# Patient Record
Sex: FEMALE | Race: WHITE | NOT HISPANIC OR LATINO | Employment: OTHER
[De-identification: names, ages, dates, MRNs, and addresses within clinical notes are randomized per-mention and may not be internally consistent; named-entity substitution may affect disease eponyms.]

---

## 2017-09-24 ENCOUNTER — HEALTH MAINTENANCE LETTER (OUTPATIENT)
Age: 62
End: 2017-09-24

## 2020-11-03 ENCOUNTER — TRANSFERRED RECORDS (OUTPATIENT)
Dept: MULTI SPECIALTY CLINIC | Facility: CLINIC | Age: 65
End: 2020-11-03

## 2024-07-30 ENCOUNTER — OFFICE VISIT (OUTPATIENT)
Dept: FAMILY MEDICINE | Facility: CLINIC | Age: 69
End: 2024-07-30
Payer: COMMERCIAL

## 2024-07-30 VITALS
RESPIRATION RATE: 16 BRPM | HEART RATE: 89 BPM | DIASTOLIC BLOOD PRESSURE: 80 MMHG | BODY MASS INDEX: 23.05 KG/M2 | TEMPERATURE: 98.3 F | SYSTOLIC BLOOD PRESSURE: 122 MMHG | HEIGHT: 64 IN | WEIGHT: 135 LBS | OXYGEN SATURATION: 97 %

## 2024-07-30 DIAGNOSIS — F10.20 ALCOHOL USE DISORDER, MODERATE, DEPENDENCE (H): ICD-10-CM

## 2024-07-30 DIAGNOSIS — M19.042 PRIMARY OSTEOARTHRITIS OF BOTH HANDS: Primary | ICD-10-CM

## 2024-07-30 DIAGNOSIS — F33.1 MAJOR DEPRESSIVE DISORDER, RECURRENT EPISODE, MODERATE (H): ICD-10-CM

## 2024-07-30 DIAGNOSIS — M19.041 PRIMARY OSTEOARTHRITIS OF BOTH HANDS: Primary | ICD-10-CM

## 2024-07-30 PROCEDURE — 99203 OFFICE O/P NEW LOW 30 MIN: CPT | Performed by: NURSE PRACTITIONER

## 2024-07-30 RX ORDER — AMLODIPINE BESYLATE 2.5 MG/1
2.5 TABLET ORAL DAILY
COMMUNITY
Start: 2023-09-18

## 2024-07-30 RX ORDER — ATORVASTATIN CALCIUM 20 MG/1
20 TABLET, FILM COATED ORAL DAILY
COMMUNITY
Start: 2023-09-18

## 2024-07-30 RX ORDER — BUPROPION HYDROCHLORIDE 150 MG/1
1 TABLET ORAL DAILY
COMMUNITY
Start: 2023-09-18 | End: 2024-09-17

## 2024-07-30 RX ORDER — QUETIAPINE FUMARATE 25 MG/1
25 TABLET, FILM COATED ORAL 2 TIMES DAILY
COMMUNITY

## 2024-07-30 RX ORDER — TIZANIDINE 2 MG/1
2 TABLET ORAL
COMMUNITY
Start: 2023-10-03 | End: 2024-09-27

## 2024-07-30 ASSESSMENT — PAIN SCALES - GENERAL: PAINLEVEL: SEVERE PAIN (7)

## 2024-07-30 NOTE — PROGRESS NOTES
"  Assessment & Plan     Primary osteoarthritis of both hands  Continues to have hand pain x-ray did show some degenerative changes we will start with hand therapy and if not improving follow-up with orthopedics  - REVIEW OF HEALTH MAINTENANCE PROTOCOL ORDERS  - Occupational Therapy  Referral; Future  - Orthopedic  Referral; Future    Major depressive disorder, recurrent episode, moderate (H)  Stable    Alcohol use disorder, moderate, dependence (H)  In remission            See Patient Instructions    Manav Cabrera is a 68 year old, presenting for the following health issues:  Hand Pain      7/30/2024    12:58 PM   Additional Questions   Roomed by Krysta     Hand Pain    History of Present Illness       Reason for visit:  Jinfer joint pain  Symptom onset:  More than a month    She eats 2-3 servings of fruits and vegetables daily.She consumes 2 sweetened beverage(s) daily.She exercises with enough effort to increase her heart rate 10 to 19 minutes per day.  She exercises with enough effort to increase her heart rate 3 or less days per week. She is missing 1 dose(s) of medications per week.             Review of Systems  Constitutional, HEENT, cardiovascular, pulmonary, gi and gu systems are negative, except as otherwise noted.      Objective    /80 (BP Location: Right arm)   Pulse 89   Temp 98.3  F (36.8  C) (Tympanic)   Resp 16   Ht 1.613 m (5' 3.5\")   Wt 61.2 kg (135 lb)   LMP 07/30/2009   SpO2 97%   BMI 23.54 kg/m    Body mass index is 23.54 kg/m .  Physical Exam   GENERAL: alert and no distress  EYES: Eyes grossly normal to inspection, PERRL and conjunctivae and sclerae normal  RESP: lungs clear to auscultation - no rales, rhonchi or wheezes  CV: regular rate and rhythm, normal S1 S2, no S3 or S4, no murmur, click or rub, no peripheral edema  MS: no gross musculoskeletal defects noted, no edema swelling to the PIP joint with limited range of motion to the left and right " hand  SKIN: no suspicious lesions or rashes  NEURO: Normal strength and tone, mentation intact and speech normal  PSYCH: mentation appears normal, affect normal/bright    No results found for any visits on 07/30/24.        Signed Electronically by: TRINY Quinones CNP

## 2024-08-04 PROBLEM — J96.01 ACUTE RESPIRATORY FAILURE WITH HYPOXIA (H): Status: RESOLVED | Noted: 2024-08-04 | Resolved: 2024-08-04

## 2024-08-07 ENCOUNTER — THERAPY VISIT (OUTPATIENT)
Dept: OCCUPATIONAL THERAPY | Facility: CLINIC | Age: 69
End: 2024-08-07
Attending: NURSE PRACTITIONER
Payer: COMMERCIAL

## 2024-08-07 DIAGNOSIS — M19.041 PRIMARY OSTEOARTHRITIS OF BOTH HANDS: ICD-10-CM

## 2024-08-07 DIAGNOSIS — M19.042 PRIMARY OSTEOARTHRITIS OF BOTH HANDS: ICD-10-CM

## 2024-08-07 PROCEDURE — 97535 SELF CARE MNGMENT TRAINING: CPT | Mod: GO | Performed by: OCCUPATIONAL THERAPIST

## 2024-08-07 PROCEDURE — 97022 WHIRLPOOL THERAPY: CPT | Mod: GO | Performed by: OCCUPATIONAL THERAPIST

## 2024-08-07 PROCEDURE — 97165 OT EVAL LOW COMPLEX 30 MIN: CPT | Mod: GO | Performed by: OCCUPATIONAL THERAPIST

## 2024-08-07 PROCEDURE — 97110 THERAPEUTIC EXERCISES: CPT | Mod: GO | Performed by: OCCUPATIONAL THERAPIST

## 2024-08-07 NOTE — PROGRESS NOTES
OCCUPATIONAL THERAPY EVALUATION  Type of Visit: Evaluation       Fall Risk Screen:  Fall screen completed by: OT  Have you fallen 2 or more times in the past year?: No  Have you fallen and had an injury in the past year?: No  Is patient a fall risk?: No    Subjective      Presenting condition or subjective complaint: swollen finger joints. Was packing to come home from [place] and since then has had swelling in left Pip since and then started on right same finger and joint about a month ago. . Is painful and swollen . Getting harder to make a fist or open a jar  Date of onset: 04/30/24    Relevant medical history:     Dates & types of surgery: tonsilectomy age 7 tubaligation 1982,tendonitis elbow about 1992    Prior diagnostic imaging/testing results: X-ray   IMPRESSION: No fracture. Near-anatomic alignment. Moderate degenerative joint disease involving the proximal and distal interphalangeal joints, with small dorsal osteophytes at both joint spaces. Soft tissue edema.  Exam End: 06/04/24  3:34 PM     Prior therapy history for the same diagnosis, illness or injury: No      Prior Level of Function  Transfers: Independent  Ambulation: Independent  ADL: Independent  IADL:  Independent    Living Environment  Social support: With a significant other or spouse   Type of home: House   Stairs to enter the home:         Ramp: No   Stairs inside the home: Yes 15 Is there a railing: Yes     Help at home: None  Equipment owned: [employer]     Employment: No    Hobbies/Interests: thrifting, gardening    Patient goals for therapy: make a closed fist and be able to open jars    Pain assessment: See objective evaluation for additional pain details     Objective   ADDITIONAL HISTORY:  Right hand dominant and Ambidextrous  Patient reports symptoms of pain, stiffness/loss of motion, weakness/loss of strength, and edema  Transportation: drives  Currently retired    Functional Outcome Measure:       8/7/2024     9:02 AM   Upper Extremity  Functional Index (  1996 [place])   a.  Any of your usual work, housework or school activities 2-Moderate Difficulty   b.  Your usual hobbies, recreational or sporting activities 2-Moderate Difficulty   c.  Lifting a bag of groceries to waist level 2-Moderate Difficulty   d.  Placing an object onto, or removing it from an overhead shelf 2-Moderate Difficulty   e.  Washing your hair or scalp 2-Moderate Difficulty   f.   Pushing up on your hands (e.g., from bathtub or chair) 2-Moderate Difficulty   g.  Preparing food (e.g., peeling, cutting) 2-Moderate Difficulty   h.  Driving 3-A Little bit of Difficulty   I.   Vacuuming, sweeping, or raking 2-Moderate Difficulty   j.   Dressing 3-A Little bit of Difficulty   k.  Doing up buttons 2-Moderate Difficulty   l.   Using tools or appliances 2-Moderate Difficulty   m. Opening doors 2-Moderate Difficulty   n.  Cleaning 2-Moderate Difficulty   o.  Tying or lacing shoes 3-A Little bit of Difficulty   p.  Sleeping 2-Moderate Difficulty   q.  Laundering clothes. (e.g., washing, ironing, folding) 2-Moderate Difficulty   r.   Opening a jar 1-Quite a bit of Difficulty   s.  Throwing a ball 2-Moderate Difficulty   t.   Carrying a small suitcase with your affected limb  2-Moderate Difficulty   Column Totals: /80 42        PAIN:  Pain Level at Rest: 4/10  Pain Level with Use: 9/10  Pain Location: small finger  Pain Quality: Aching and Throbbing  Pain Frequency: constant  Pain is Worst: daytime  Pain is Exacerbated By: lifting, opening things  Pain is Relieved By: has not tried much and none  Pain Progression: Worsened    POSTURE: Normal     EDEMA: Mild    Finger/Thumb   (Circumference measured in cm) 8/7/2024   Small P1    PIP Left-5.6,Right-5.4              SENSATION: WNL throughout all nerve distributions; per patient report         ROM:   Hand ROM  Left AROM Right AROM    Small MP     PIP 20/55 0/75     Flexion Lag (Tips of fingernails to DPC or cast as measured (cm) by Digit o  Meter) Left Right   DPC to Index 2 2   DPC to Long 2.5 1.5   DPC to Ring 3 1.5   DPC to Small 3 1   DPC to Thumb         OBSERVATIONS/APPEARANCE:  PIP joints appear larger on all especially left PIP          STRENGTH:     Measured in pounds 8/7/2024 8/7/2024    Left Right   Average 22 7/10 pain 28     Lateral Pinch  Measured in pounds 8/7/2024 8/7/2024    Left Right   Average 12 12     3 Point Pinch  Measured in pounds 8/7/2024 8/7/2024    Left Right   Average 7 10       PALPATION:  Increased pain with palpation to PIP joints small fingers especially left           Assessment & Plan   CLINICAL IMPRESSIONS  Medical Diagnosis: Bilateral Hand Osteoarthritis    Treatment Diagnosis: decreased ADL's IADL's due to pain    Impression/Assessment: Pt is a 68 year old female presenting to Occupational Therapy due to above dx.  The following significant findings have been identified: Impaired ROM, Impaired strength, Pain, and edema .  These identified deficits interfere with their ability to perform self care tasks, recreational activities, household chores, driving , and  yard work as compared to previous level of function.     Clinical Decision Making (Complexity):  Assessment of Occupational Performance: 5 or more Performance Deficits  Occupational Performance Limitations: dressing, hygiene and grooming, driving and community mobility, meal preparation and cleanup, sleep, and leisure activities  Clinical Decision Making (Complexity): Low complexity    PLAN OF CARE  Treatment Interventions:  Modalities:  Fluidotherapy and Paraffin  Therapeutic Exercise:  AROM, PROM, Tendon Gliding, Blocking, and Isometrics  Manual Techniques:  Myofascial release and STM  Orthotic Fabrication:  Finger based  Self Care:  Self Care Tasks and Ergonomic Considerations    Long Term Goals   OT Goal 1  Goal Identifier: closed fist  Goal Description: patientt o be able to make a closed fist  Rationale: In order to maximize safety and independence  with ADL/IADLs  Target Date: 09/11/24  OT Goal 2  Goal Identifier: open jars  Goal Description: Increase bilateral  strength by  8# to be able to better  get jars open  Rationale: In order to maximize safety and independence with ADL/IADLs  Target Date: 09/18/24  OT Goal 3  Goal Identifier: home program  Goal Description: Patient to be independent with home program, not needing more than 15% verbal or physical cuing  Target Date: 09/18/24      Frequency of Treatment: 1x/week  Duration of Treatment: 6 weeks     Recommended Referrals to Other Professionals:   Education Assessment: Learner/Method: Patient;Listening;Demonstration;Reading;Pictures/Video;No Barriers to Learning  Education Comments: PTRX printed     Risks and benefits of evaluation/treatment have been explained.   Patient/Family/caregiver agrees with Plan of Care.     Evaluation Time:    OT Eval, Low Complexity Minutes (63505): 20       Signing Clinician:  VERO Noguera/L CHT  Occupational Therapist, Certified Hand Therapist

## 2024-08-14 ENCOUNTER — THERAPY VISIT (OUTPATIENT)
Dept: OCCUPATIONAL THERAPY | Facility: CLINIC | Age: 69
End: 2024-08-14
Attending: NURSE PRACTITIONER
Payer: COMMERCIAL

## 2024-08-14 DIAGNOSIS — M19.042 PRIMARY OSTEOARTHRITIS OF BOTH HANDS: Primary | ICD-10-CM

## 2024-08-14 DIAGNOSIS — M19.041 PRIMARY OSTEOARTHRITIS OF BOTH HANDS: Primary | ICD-10-CM

## 2024-08-14 PROCEDURE — 97110 THERAPEUTIC EXERCISES: CPT | Mod: GO | Performed by: OCCUPATIONAL THERAPIST

## 2024-08-14 PROCEDURE — 97140 MANUAL THERAPY 1/> REGIONS: CPT | Mod: GO | Performed by: OCCUPATIONAL THERAPIST

## 2024-08-14 PROCEDURE — 97022 WHIRLPOOL THERAPY: CPT | Mod: GO | Performed by: OCCUPATIONAL THERAPIST

## 2024-08-28 ENCOUNTER — OFFICE VISIT (OUTPATIENT)
Dept: ORTHOPEDICS | Facility: CLINIC | Age: 69
End: 2024-08-28
Attending: NURSE PRACTITIONER
Payer: COMMERCIAL

## 2024-08-28 DIAGNOSIS — M19.042 PRIMARY OSTEOARTHRITIS OF BOTH HANDS: ICD-10-CM

## 2024-08-28 DIAGNOSIS — M19.041 PRIMARY OSTEOARTHRITIS OF BOTH HANDS: ICD-10-CM

## 2024-08-28 PROCEDURE — 99203 OFFICE O/P NEW LOW 30 MIN: CPT | Performed by: PEDIATRICS

## 2024-08-28 NOTE — LETTER
8/28/2024      Deborah Collins  [address]  [place] [address]      Dear Colleague,    Thank you for referring your patient, Deborah Collins, to the [facility] [place]. Please see a copy of my visit note below.    ASSESSMENT & PLAN    Deborah was seen today for pain and pain.    Diagnoses and all orders for this visit:    Primary osteoarthritis of both hands  -     Orthopedic  Referral      This issue is chronic and Unchanged.        ICD-10-CM    1. Primary osteoarthritis of both hands  M19.041 Orthopedic  Referral    M19.042         Patient Instructions   We discussed supportive care of bracing and OTC medications, role of occupational hand therapy, consideration of injections and surgical referral.    Plan:  - Today's Plan of Care:  Continue Occupational Hand Therapy  Discussed bracing options - compression sleeves    Discussed activity considerations and other supportive care including Ice/Heat, OTC and other topical medications as needed. Diclofenac/Voltaren Gel.    -We also discussed other future treatment options:  US guided joint injections  Referral to Hand Surgery    Follow Up: as needed    Concerning signs and symptoms were reviewed and all questions were answered at this time.    Thanks for the opportunity to participate in the care of this patient, I will keep you updated on their progress.    CC: Fany Bonilla MD [facility]  Sports Medicine Physician  [facility]    -----  Chief Complaint   Patient presents with     Right Little Finger - Pain     Left Little Finger - Pain       SUBJECTIVE  Deborah Collins is a/an 68 year old female who is seen in consultation at the request of  Fany Higgins M.D. for evaluation of HORACE hands, little fingers on both sides.    The patient is seen by themselves.  The patient is Ambidextrous handed    Onset: 3 month(s) ago. Reports insidious onset without acute precipitating  event.  Location of Pain: bilateral hands, little fingers, PIP joint   Worsened by: grasping, grabbing, flexion   Better with: bracing from hand therapy  Treatments tried: Tylenol, home exercises, physical therapy (2 visits), previous imaging (xray 6/5/24 @ [facility]), and casting/splinting/bracing  Associated symptoms: swelling, weakness of HORACE little fingers, and throbbing pain    Orthopedic/Surgical history: YES - Date: has had many fractures in her fingers   Social History/Occupation: retired, gardening, thrifing      REVIEW OF SYSTEMS:  Review of Systems    OBJECTIVE:  LMP 07/30/2009    General: healthy, alert and in no distress  Skin: no suspicious lesions or rash.  CV: distal perfusion intact   Resp: normal respiratory effort without conversational dyspnea   Psych: normal mood and affect  Gait: NORMAL  Neuro: Normal light sensory exam of upper extremity    Bilateral Wrist and Hand exam    Inspection:       No swelling or bruising - arthritis deformities noted bilaterally    Tender:       5th finger PIP joints bilaterally    Non Tender:       Remainder of the Wrist and Hand bilateral    ROM:       Decreased full flexion bilateral 5th fingers at PIP joint, otherwise, full and symmetric active and passive range of motion of the forearm, wrist and digits bilateral    Strength:       5/5 strength in the muscles of the hand, wrist and forearm bilateral    Neurovascular:       2+ radial pulses bilaterally with brisk capillary refill and      normal sensation to light touch in the radial, median and ulnar nerve distributions       RADIOLOGY:  Final results and radiologist's interpretation, available in the [facility] health record.  Images were reviewed with the patient in the office today.  My personal interpretation of the performed imaging:     3 XR views of bilateral 5th fingers reviewed from 7/30/2021: no acute bony abnormality, degenerative change at PIP joints left > right  - will follow official  read    Review of the result(s) of each unique test - XR             Again, thank you for allowing me to participate in the care of your patient.        Sincerely,        Raeann Bonilla MD

## 2024-08-28 NOTE — PATIENT INSTRUCTIONS
We discussed supportive care of bracing and OTC medications, role of occupational hand therapy, consideration of injections and surgical referral.    Plan:  - Today's Plan of Care:  Continue Occupational Hand Therapy  Discussed bracing options - compression sleeves    Discussed activity considerations and other supportive care including Ice/Heat, OTC and other topical medications as needed. Diclofenac/Voltaren Gel.    -We also discussed other future treatment options:  US guided joint injections  Referral to Hand Surgery    Follow Up: as needed    If you have any further questions for your physician or physician s care team you can call [phone].

## 2024-08-28 NOTE — PROGRESS NOTES
ASSESSMENT & PLAN    Deborah was seen today for pain and pain.    Diagnoses and all orders for this visit:    Primary osteoarthritis of both hands  -     Orthopedic  Referral      This issue is chronic and Unchanged.        ICD-10-CM    1. Primary osteoarthritis of both hands  M19.041 Orthopedic  Referral    M19.042         Patient Instructions   We discussed supportive care of bracing and OTC medications, role of occupational hand therapy, consideration of injections and surgical referral.    Plan:  - Today's Plan of Care:  Continue Occupational Hand Therapy  Discussed bracing options - compression sleeves    Discussed activity considerations and other supportive care including Ice/Heat, OTC and other topical medications as needed. Diclofenac/Voltaren Gel.    -We also discussed other future treatment options:  US guided joint injections  Referral to Hand Surgery    Follow Up: as needed    Concerning signs and symptoms were reviewed and all questions were answered at this time.    Thanks for the opportunity to participate in the care of this patient, I will keep you updated on their progress.    CC: Fany Bonilla MD [facility]  Sports Medicine Physician  [facility]    -----  Chief Complaint   Patient presents with    Right Little Finger - Pain    Left Little Finger - Pain       SUBJECTIVE  Deborah Collins is a/an 68 year old female who is seen in consultation at the request of  Fany Higgins M.D. for evaluation of HORACE hands, little fingers on both sides.    The patient is seen by themselves.  The patient is Ambidextrous handed    Onset: 3 month(s) ago. Reports insidious onset without acute precipitating event.  Location of Pain: bilateral hands, little fingers, PIP joint   Worsened by: grasping, grabbing, flexion   Better with: bracing from hand therapy  Treatments tried: Tylenol, home exercises, physical therapy (2 visits), previous imaging (xray 6/5/24 @  [facility]), and casting/splinting/bracing  Associated symptoms: swelling, weakness of HORACE little fingers, and throbbing pain    Orthopedic/Surgical history: YES - Date: has had many fractures in her fingers   Social History/Occupation: retired, gardening, thrifing      REVIEW OF SYSTEMS:  Review of Systems    OBJECTIVE:  LMP 07/30/2009    General: healthy, alert and in no distress  Skin: no suspicious lesions or rash.  CV: distal perfusion intact   Resp: normal respiratory effort without conversational dyspnea   Psych: normal mood and affect  Gait: NORMAL  Neuro: Normal light sensory exam of upper extremity    Bilateral Wrist and Hand exam    Inspection:       No swelling or bruising - arthritis deformities noted bilaterally    Tender:       5th finger PIP joints bilaterally    Non Tender:       Remainder of the Wrist and Hand bilateral    ROM:       Decreased full flexion bilateral 5th fingers at PIP joint, otherwise, full and symmetric active and passive range of motion of the forearm, wrist and digits bilateral    Strength:       5/5 strength in the muscles of the hand, wrist and forearm bilateral    Neurovascular:       2+ radial pulses bilaterally with brisk capillary refill and      normal sensation to light touch in the radial, median and ulnar nerve distributions       RADIOLOGY:  Final results and radiologist's interpretation, available in the [facility] health record.  Images were reviewed with the patient in the office today.  My personal interpretation of the performed imaging:     3 XR views of bilateral 5th fingers reviewed from 7/30/2021: no acute bony abnormality, degenerative change at PIP joints left > right  - will follow official read    Review of the result(s) of each unique test - XR

## 2024-08-31 ENCOUNTER — ANCILLARY PROCEDURE (OUTPATIENT)
Dept: GENERAL RADIOLOGY | Facility: CLINIC | Age: 69
End: 2024-08-31
Attending: PEDIATRICS
Payer: COMMERCIAL

## 2024-08-31 ENCOUNTER — OFFICE VISIT (OUTPATIENT)
Dept: URGENT CARE | Facility: URGENT CARE | Age: 69
End: 2024-08-31
Payer: COMMERCIAL

## 2024-08-31 VITALS
HEART RATE: 80 BPM | BODY MASS INDEX: 23.71 KG/M2 | SYSTOLIC BLOOD PRESSURE: 124 MMHG | OXYGEN SATURATION: 96 % | TEMPERATURE: 98.3 F | DIASTOLIC BLOOD PRESSURE: 78 MMHG | WEIGHT: 136 LBS | RESPIRATION RATE: 16 BRPM

## 2024-08-31 DIAGNOSIS — M54.50 ACUTE BILATERAL LOW BACK PAIN WITHOUT SCIATICA: ICD-10-CM

## 2024-08-31 DIAGNOSIS — M54.50 ACUTE BILATERAL LOW BACK PAIN WITHOUT SCIATICA: Primary | ICD-10-CM

## 2024-08-31 PROCEDURE — 72170 X-RAY EXAM OF PELVIS: CPT | Mod: TC | Performed by: RADIOLOGY

## 2024-08-31 PROCEDURE — 99214 OFFICE O/P EST MOD 30 MIN: CPT | Performed by: PEDIATRICS

## 2024-08-31 PROCEDURE — 73552 X-RAY EXAM OF FEMUR 2/>: CPT | Mod: TC | Performed by: RADIOLOGY

## 2024-08-31 PROCEDURE — 72100 X-RAY EXAM L-S SPINE 2/3 VWS: CPT | Mod: TC | Performed by: STUDENT IN AN ORGANIZED HEALTH CARE EDUCATION/TRAINING PROGRAM

## 2024-08-31 RX ORDER — CYCLOBENZAPRINE HCL 5 MG
5 TABLET ORAL 3 TIMES DAILY PRN
Qty: 20 TABLET | Refills: 0 | Status: SHIPPED | OUTPATIENT
Start: 2024-08-31

## 2024-08-31 ASSESSMENT — ENCOUNTER SYMPTOMS
CONSTIPATION: 0
WOUND: 0
DIARRHEA: 0
NUMBNESS: 0
BACK PAIN: 1
WEAKNESS: 1
FEVER: 0
ACTIVITY CHANGE: 1
DIFFICULTY URINATING: 0

## 2024-08-31 NOTE — PROGRESS NOTES
Patient presents with:  Fall: Pt fell down into the toilet and hit all around her buttock and hips 2 weeks ago, most right upper leg and buttock area feels numb, below hip to lower back pain. Pt has history of tailbone dislocation.      Clinical Decision Making:      ICD-10-CM    1. Acute bilateral low back pain without sciatica  M54.50 XR Femur Bilateral 2 Views     XR Lumbar Spine 2/3 Views     XR Pelvis 1/2 Views     cyclobenzaprine (FLEXERIL) 5 MG tablet     Orthopedic  Referral     CANCELED: XR Pelvis and Hip Bilateral 2 Views      - Likely soft tissue injury of the lower back. Potential involvement of muscle spasm and/or nerve injury given the radiation of pain. Given flexeril for muscle spasm. Referred to sports medicine if it does not begin to improve in the next 2 weeks.  - Xrays without concern for acute fracture    There are no Patient Instructions on file for this visit.    HPI:  Deborah Collins is a 68 year old female who presents today complaining of hip and back pain.    Fell back into the toilet because the seat was up. This occurred about 2 weeks ago. Also having low back pain that has come and gone.    Today she was helping set up a tent. Today's visit is prompted by the pain worsening. Has had weakness of both thighs though more on the R than L. Has been limping today.     Has been trying ibuprofen at home. Has arthritis for which she takes medication. That has not changed. Bowel/bladder function has not changed.     History obtained from the patient.    Problem List:  2024-08: Primary osteoarthritis of both hands  2024-08: Acute respiratory failure with hypoxia (H)  2015-10: Major depressive disorder, recurrent episode, moderate (H)  2015-10: Alcohol use disorder, moderate, dependence (H)  2015-10: Adjustment disorder with mixed anxiety and depressed mood  2010-10: CARDIOVASCULAR SCREENING; LDL GOAL LESS THAN 160  2010-09: Mild major depression (H)      Past Medical History:   Diagnosis  Date    Anxiety     Elbow pain     GERD (gastroesophageal reflux disease)        Social History     Tobacco Use    Smoking status: Never    Smokeless tobacco: Never   Substance Use Topics    Alcohol use: No       Review of Systems   Constitutional:  Positive for activity change. Negative for fever.   Gastrointestinal:  Negative for constipation and diarrhea.   Genitourinary:  Negative for difficulty urinating and urgency.   Musculoskeletal:  Positive for back pain and gait problem.   Skin:  Negative for wound.   Neurological:  Positive for weakness. Negative for numbness.       Vitals:    08/31/24 1054   BP: 124/78   Pulse: 80   Resp: 16   Temp: 98.3  F (36.8  C)   TempSrc: Tympanic   SpO2: 96%   Weight: 61.7 kg (136 lb)       Physical Exam  Constitutional:       General: She is not in acute distress.     Appearance: She is not toxic-appearing.   Musculoskeletal:         General: Tenderness present. No swelling.      Comments: Tenderness b/l and midline around the L4-L5 level. No obvious deformities. Mild tenderness in the b/l thighs lateral aspects   Skin:     General: Skin is warm and dry.      Findings: No bruising, lesion or rash.   Neurological:      Mental Status: She is alert.         Results:  Results for orders placed or performed in visit on 08/31/24   XR Lumbar Spine 2/3 Views     Status: None    Narrative    EXAM: XR LUMBAR SPINE 2/3 VIEWS  LOCATION: [facility]  DATE: 8/31/2024    INDICATION:  Acute bilateral low back pain without sciatica  COMPARISON: Lumbar spine MRI: 7/9/2022.      Impression    IMPRESSION:     1.  Age-indeterminate fracture of the T12 superior endplate with approximately 40% central height loss, new since July 2022. If there is concern for acute fracture in this locale, CT or MRI could further evaluate. Similar mild chronic height loss   involving the L5 superior endplate. Remainder of the lumbar vertebral body heights are maintained.  2.  Alignment is  within normal limits.  3.  Multilevel degenerative disc disease with disc height loss most pronounced and mild to moderate at L5-S1. Multilevel facet arthropathy.  4.  Osteopenia.  5.  Mild calcified atherosclerosis of the abdominal aorta.   Results for orders placed or performed in visit on 08/31/24   XR Pelvis 1/2 Views     Status: None    Narrative    EXAM: XR PELVIS 1/2 VIEWS, XR FEMUR BILATERAL 2 VIEWS  LOCATION: [facility]  DATE: 08/31/2024    INDICATION: Acute bilateral low back pain without sciatica.  COMPARISON: None.      Impression    IMPRESSION: Single AP view of the pelvis shows nothing to suggest an acute displaced fracture or dislocation. Minimal arthritic change both hips. Advanced arthrosis within the pubic symphysis. Bones are demineralized.    There is no evidence of an acute displaced femoral fracture on either side.      Results for orders placed or performed in visit on 08/31/24   XR Femur Bilateral 2 Views     Status: None    Narrative    EXAM: XR PELVIS 1/2 VIEWS, XR FEMUR BILATERAL 2 VIEWS  LOCATION: [facility]  DATE: 08/31/2024    INDICATION: Acute bilateral low back pain without sciatica.  COMPARISON: None.      Impression    IMPRESSION: Single AP view of the pelvis shows nothing to suggest an acute displaced fracture or dislocation. Minimal arthritic change both hips. Advanced arthrosis within the pubic symphysis. Bones are demineralized.    There is no evidence of an acute displaced femoral fracture on either side.            At the end of the encounter, I discussed results, diagnosis, medications. Discussed indications for follow up if no improvement. Patient understood and agreed to plan. Patient was stable for discharge.    Bassam Patel MD, MPH

## 2024-09-07 ENCOUNTER — HEALTH MAINTENANCE LETTER (OUTPATIENT)
Age: 69
End: 2024-09-07

## 2024-09-27 ENCOUNTER — MYC REFILL (OUTPATIENT)
Dept: FAMILY MEDICINE | Facility: CLINIC | Age: 69
End: 2024-09-27
Payer: COMMERCIAL

## 2024-09-27 DIAGNOSIS — M62.838 MUSCLE SPASM: Primary | ICD-10-CM

## 2024-09-27 RX ORDER — TIZANIDINE 2 MG/1
2 TABLET ORAL 2 TIMES DAILY PRN
Qty: 60 TABLET | Refills: 0 | Status: SHIPPED | OUTPATIENT
Start: 2024-09-27

## 2024-09-27 NOTE — TELEPHONE ENCOUNTER
Pending Prescriptions:                       Disp   Refills    tiZANidine (ZANAFLEX) 2 MG tablet                          Sig: Take 1 tablet (2 mg) by mouth.    Routing refill request to provider for review/approval because:  Drug not on the [facility] refill protocol   Medication is reported/historical    Kirsten Herrera RN  [facility]

## 2025-01-21 DIAGNOSIS — M62.838 MUSCLE SPASM: ICD-10-CM

## 2025-01-21 DIAGNOSIS — I10 BENIGN ESSENTIAL HYPERTENSION: Primary | ICD-10-CM

## 2025-01-21 RX ORDER — AMLODIPINE BESYLATE 2.5 MG/1
2.5 TABLET ORAL DAILY
Qty: 30 TABLET | Refills: 0 | Status: SHIPPED | OUTPATIENT
Start: 2025-01-21

## 2025-01-21 RX ORDER — TIZANIDINE 2 MG/1
2 TABLET ORAL 2 TIMES DAILY PRN
Qty: 60 TABLET | Refills: 0 | Status: SHIPPED | OUTPATIENT
Start: 2025-01-21

## 2025-01-21 NOTE — TELEPHONE ENCOUNTER
"Requested Prescriptions   Pending Prescriptions Disp Refills    tiZANidine (ZANAFLEX) 2 MG tablet 60 tablet 0     Sig: Take 1 tablet (2 mg) by mouth 2 times daily as needed for muscle spasms.       There is no refill protocol information for this order       amLODIPine (NORVASC) 2.5 MG tablet       Sig: Take 1 tablet (2.5 mg) by mouth daily.       Calcium Channel Blockers Protocol  Failed - 1/21/2025  1:26 PM        Failed - Medication is indicated for associated diagnosis        Failed - GFR is on file in the past 12 months and most recent GFR is normal   No results found for: \"GFRESTIMATED\"       Passed - Most recent blood pressure under 140/90 in past 12 months     BP Readings from Last 3 Encounters:   08/31/24 124/78   07/30/24 122/80   10/15/09 120/77       No data recorded            Passed - Medication is active on med list        Passed - Recent (12 mo) or future (90 days) visit within the authorizing provider's specialty     The patient must have completed an in-person or virtual visit within the past 12 months or has a future visit scheduled within the next 90 days with the authorizing provider s specialty.  Urgent care and e-visits do not qualify as an office visit for this protocol.          Passed - Patient is age 18 or older        Passed - No active pregnancy on record        Passed - No positive pregnancy test in past 12 months             "

## 2025-01-21 NOTE — TELEPHONE ENCOUNTER
Patient called requesting rx for ropinirole 2 mg tablet, 1 table at bed time,     Requesting a 90 day supply as she is in [place]. For the next 3 months.     Last office visit: 7/30/2024     Janice FIELDS  [facility]

## 2025-02-23 DIAGNOSIS — I10 BENIGN ESSENTIAL HYPERTENSION: ICD-10-CM

## 2025-02-24 RX ORDER — AMLODIPINE BESYLATE 2.5 MG/1
2.5 TABLET ORAL DAILY
Qty: 90 TABLET | Refills: 0 | Status: SHIPPED | OUTPATIENT
Start: 2025-02-24

## 2025-03-04 ENCOUNTER — TELEPHONE (OUTPATIENT)
Dept: FAMILY MEDICINE | Facility: CLINIC | Age: 70
End: 2025-03-04
Payer: COMMERCIAL

## 2025-03-04 DIAGNOSIS — G25.81 RESTLESS LEG SYNDROME: Primary | ICD-10-CM

## 2025-03-04 RX ORDER — ASPIRIN 81 MG/1
81 TABLET ORAL DAILY
COMMUNITY

## 2025-03-04 RX ORDER — ROPINIROLE 2 MG/1
2 TABLET, FILM COATED ORAL AT BEDTIME
COMMUNITY
End: 2025-03-04

## 2025-03-04 NOTE — TELEPHONE ENCOUNTER
Spoke with Deborah to update her medication list.     Last office visit: 7/30/2024 ; last virtual visit: Visit date not found     Refill pended for provider consideration.  Patricia BROWN RN

## 2025-03-04 NOTE — TELEPHONE ENCOUNTER
New Medication Request        What medication are you requesting?: Ropinirole 2mg/ she would like 2 month supply     Reason for medication request: Previous doctor retired and unable to get this medication filled. Wanted to see if you would refill medication.  [facility] doctor retired and Karen Susana continued to prescribe  this medication, before she started see Fany.    Have you taken this medication before?: Yes: Previous doctor retired and unable to get this medication filled. Wanted to see if you would refill medication.    Controlled Substance Agreement on file:   CSA -- Patient Level:    CSA: None found at the patient level.         Patient offered an appointment? No- she is in [place] until may, she has 10 pills left    Preferred Pharmacy:   [facility] [address] [place] [address] [address] [place] [address]  [address] [facility] [address]  Phone: [phone] Fax: [phone]      Could we send this information to you in [facility] or would you prefer to receive a phone call?:   Patient would prefer a phone call   Okay to leave a detailed message?: Yes at Home number on file [phone] (home)    Zita Almonte/ Patient

## 2025-03-05 NOTE — TELEPHONE ENCOUNTER
Pharmacy is in [place] we can only fill in [place] pharmacies to the I do not have a license for out-of-state.  I am willing to fill for 30 days but she will need an in office appointment as she is due for her annual physical.  If she is out of state she will need to establish with a primary care and states she  is is in     Fany Kershaw CNP

## 2025-03-05 NOTE — TELEPHONE ENCOUNTER
Fany Higgins, patient was called and notified, she is currently in [place], she asks if you will fill at [facility] in [place] and her daughter will  as the daughter is visiting patient and will bring it to the patient.     Patient requests 2 month supply, have now cued up for 60 days        Please advise the refill.      ESTELITA Davalos

## 2025-03-06 RX ORDER — ROPINIROLE 2 MG/1
2 TABLET, FILM COATED ORAL AT BEDTIME
Qty: 60 TABLET | Refills: 0 | Status: SHIPPED | OUTPATIENT
Start: 2025-03-06

## 2025-05-26 DIAGNOSIS — M62.838 MUSCLE SPASM: ICD-10-CM

## 2025-05-26 DIAGNOSIS — G25.81 RESTLESS LEG SYNDROME: ICD-10-CM

## 2025-05-27 RX ORDER — ROPINIROLE 2 MG/1
2 TABLET, FILM COATED ORAL AT BEDTIME
Qty: 60 TABLET | Refills: 0 | Status: SHIPPED | OUTPATIENT
Start: 2025-05-27

## 2025-05-28 RX ORDER — TIZANIDINE 2 MG/1
2 TABLET ORAL 2 TIMES DAILY PRN
Qty: 60 TABLET | Refills: 0 | Status: SHIPPED | OUTPATIENT
Start: 2025-05-28

## 2025-07-03 ENCOUNTER — ANCILLARY PROCEDURE (OUTPATIENT)
Dept: GENERAL RADIOLOGY | Facility: CLINIC | Age: 70
End: 2025-07-03
Attending: PHYSICIAN ASSISTANT
Payer: COMMERCIAL

## 2025-07-03 ENCOUNTER — OFFICE VISIT (OUTPATIENT)
Dept: FAMILY MEDICINE | Facility: CLINIC | Age: 70
End: 2025-07-03
Payer: COMMERCIAL

## 2025-07-03 VITALS
SYSTOLIC BLOOD PRESSURE: 124 MMHG | BODY MASS INDEX: 24.59 KG/M2 | RESPIRATION RATE: 16 BRPM | WEIGHT: 144 LBS | OXYGEN SATURATION: 99 % | HEIGHT: 64 IN | TEMPERATURE: 97.6 F | DIASTOLIC BLOOD PRESSURE: 78 MMHG | HEART RATE: 74 BPM

## 2025-07-03 DIAGNOSIS — M85.80 OSTEOPENIA, UNSPECIFIED LOCATION: ICD-10-CM

## 2025-07-03 DIAGNOSIS — Z78.0 POSTMENOPAUSAL STATUS: ICD-10-CM

## 2025-07-03 DIAGNOSIS — S92.512A CLOSED DISPLACED FRACTURE OF PROXIMAL PHALANX OF LESSER TOE OF LEFT FOOT, INITIAL ENCOUNTER: Primary | ICD-10-CM

## 2025-07-03 DIAGNOSIS — S99.922A INJURY OF LEFT FOOT, INITIAL ENCOUNTER: ICD-10-CM

## 2025-07-03 DIAGNOSIS — F41.9 ANXIETY: ICD-10-CM

## 2025-07-03 PROBLEM — I10 ESSENTIAL HYPERTENSION: Status: ACTIVE | Noted: 2019-06-25

## 2025-07-03 PROBLEM — E05.90 SUBCLINICAL HYPERTHYROIDISM: Status: ACTIVE | Noted: 2022-06-01

## 2025-07-03 RX ORDER — BUPROPION HYDROCHLORIDE 150 MG/1
150 TABLET ORAL DAILY
Qty: 90 TABLET | Refills: 0 | Status: SHIPPED | OUTPATIENT
Start: 2025-07-03

## 2025-07-03 RX ORDER — QUETIAPINE FUMARATE 25 MG/1
25 TABLET, FILM COATED ORAL 2 TIMES DAILY
Status: CANCELLED | OUTPATIENT
Start: 2025-07-03

## 2025-07-03 ASSESSMENT — ANXIETY QUESTIONNAIRES
1. FEELING NERVOUS, ANXIOUS, OR ON EDGE: NOT AT ALL
3. WORRYING TOO MUCH ABOUT DIFFERENT THINGS: NOT AT ALL
2. NOT BEING ABLE TO STOP OR CONTROL WORRYING: NOT AT ALL
GAD7 TOTAL SCORE: 0
5. BEING SO RESTLESS THAT IT IS HARD TO SIT STILL: NOT AT ALL
7. FEELING AFRAID AS IF SOMETHING AWFUL MIGHT HAPPEN: NOT AT ALL
GAD7 TOTAL SCORE: 0
IF YOU CHECKED OFF ANY PROBLEMS ON THIS QUESTIONNAIRE, HOW DIFFICULT HAVE THESE PROBLEMS MADE IT FOR YOU TO DO YOUR WORK, TAKE CARE OF THINGS AT HOME, OR GET ALONG WITH OTHER PEOPLE: NOT DIFFICULT AT ALL
6. BECOMING EASILY ANNOYED OR IRRITABLE: NOT AT ALL

## 2025-07-03 ASSESSMENT — PATIENT HEALTH QUESTIONNAIRE - PHQ9
5. POOR APPETITE OR OVEREATING: NOT AT ALL
SUM OF ALL RESPONSES TO PHQ QUESTIONS 1-9: 1
SUM OF ALL RESPONSES TO PHQ QUESTIONS 1-9: 1
10. IF YOU CHECKED OFF ANY PROBLEMS, HOW DIFFICULT HAVE THESE PROBLEMS MADE IT FOR YOU TO DO YOUR WORK, TAKE CARE OF THINGS AT HOME, OR GET ALONG WITH OTHER PEOPLE: NOT DIFFICULT AT ALL

## 2025-07-03 ASSESSMENT — PAIN SCALES - GENERAL: PAINLEVEL_OUTOF10: SEVERE PAIN (8)

## 2025-07-03 NOTE — PROGRESS NOTES
Assessment & Plan     Closed displaced fracture of proximal phalanx of lesser toe of left foot, initial encounter  4th prox phalynx at MTP joint, fair alignment but due to joint proximity will immobilize and refer to surgical specialist for monitoring - doubt surgery required.  Pain care discsused.  - Ankle/Foot Bracing Supplies Order Walking Boot; Left; Non-pneumatic; Short  - Orthopedic  Referral; Future    Anxiety  Refill, has upcoming appt with PCP but is out of bupropion.  - buPROPion (WELLBUTRIN XL) 150 MG 24 hr tablet; Take 1 tablet (150 mg) by mouth daily.  - FLUoxetine (PROZAC) 20 MG capsule; Take 2 capsules (40 mg) by mouth daily.    Osteopenia, unspecified location  Postmenopausal status  Due for 3 yr recheck and has further risk from alcohol history.  Can follow up with PCP at upcoming appt.  - XR Foot Left G/E 3 Views; Future  - DX Bone Density; Future    Patient Instructions   Calcium goal 1200 mg a day from all sources (calcium pill and foods/drinks), vitamin D at least 800 units (20 mg) to help healing  Walking boot until you see ortho/podiatry.  You will be called to set up appt.  Repeat bone density test - Call [phone] to set up your imaging testing.     Diclofenac gel, possibly retrying WITH tylenol, and walking boot prescription      Upcoming wellness visit.        Manav Cabrera is a 69 year old, presenting for the following health issues:  Foot Pain (Would like to rule out broken foot. )        7/3/2025     9:05 AM   Additional Questions   Roomed by Zita MCCRACKEN   Accompanied by self         7/3/2025     9:05 AM   Patient Reported Additional Medications   Patient reports taking the following new medications no new meds     History of Present Illness       Reason for visit:  Foot injury   She is taking medications regularly.      Pain History:  When did you first notice your pain? 10 days    Have you seen anyone else for your pain? No  How has your pain affected your ability to  "work? Not applicable  Where in your body do you have pain? Musculoskeletal problem/pain  Onset/Duration: about 10 days ago   Description  Location: foot - left  Joint Swelling: YES  Redness: YES- bruising   Pain: YES  Warmth: No  Intensity:  moderate  Progression of Symptoms:  worsening  Accompanying signs and symptoms:   Fevers: No  Numbness/tingling/weakness: No  History  Trauma to the area: YES- stepped off a pool ladder wrong   Recent illness:  No  Previous similar problem: broken toe in the past   Previous evaluation:  No  Precipitating or alleviating factors:  Aggravating factors include: walking, pressure, and bending toes   Therapies tried and outcome: ice     Ribs 4 times, tailbone 3 times, collarbone, toes, fingers.      Anxiety   How are you doing with your anxiety since your last visit? No change  Are you having other symptoms that might be associated with anxiety? No  Have you had a significant life event? No   Are you feeling depressed? No  Do you have any concerns with your use of alcohol or other drugs? No    Social History     Tobacco Use    Smoking status: Never    Smokeless tobacco: Never   Vaping Use    Vaping status: Never Used   Substance Use Topics    Alcohol use: No    Drug use: No          No data to display                  7/3/2025     8:56 AM   PHQ   PHQ-9 Total Score 1    Q9: Thoughts of better off dead/self-harm past 2 weeks Not at all       Patient-reported         Objective    /78   Pulse 74   Temp 97.6  F (36.4  C) (Tympanic)   Resp 16   Ht 1.613 m (5' 3.5\")   Wt 65.3 kg (144 lb)   LMP 07/30/2009   SpO2 99%   BMI 25.11 kg/m    Body mass index is 25.11 kg/m .  Physical Exam   Focal tenderness distal 5th metatarsal, diffuse tenderness toes and distal metatarsals        Signed Electronically by: Gudelia Bella PA-C    " Emigdio Isaac)  Orthopaedic Surgery  [address] [place] [address]  Phone: [phone]  Fax: [phone]  Follow Up Time:

## 2025-07-03 NOTE — PATIENT INSTRUCTIONS
Calcium goal 1200 mg a day from all sources (calcium pill and foods/drinks), vitamin D at least 800 units (20 mg) to help healing  Walking boot until you see ortho/podiatry.  You will be called to set up appt.  Repeat bone density test - Call [phone] to set up your imaging testing.     Diclofenac gel, possibly retrying WITH tylenol, and walking boot prescription      Upcoming wellness visit.

## 2025-07-05 ENCOUNTER — PATIENT OUTREACH (OUTPATIENT)
Dept: CARE COORDINATION | Facility: CLINIC | Age: 70
End: 2025-07-05
Payer: COMMERCIAL

## 2025-07-07 ENCOUNTER — PATIENT OUTREACH (OUTPATIENT)
Dept: CARE COORDINATION | Facility: CLINIC | Age: 70
End: 2025-07-07
Payer: COMMERCIAL

## 2025-07-10 ENCOUNTER — OFFICE VISIT (OUTPATIENT)
Dept: PODIATRY | Facility: CLINIC | Age: 70
End: 2025-07-10
Payer: COMMERCIAL

## 2025-07-10 VITALS — HEIGHT: 64 IN | WEIGHT: 144 LBS | BODY MASS INDEX: 24.59 KG/M2

## 2025-07-10 DIAGNOSIS — S92.512A CLOSED DISPLACED FRACTURE OF PROXIMAL PHALANX OF LESSER TOE OF LEFT FOOT, INITIAL ENCOUNTER: ICD-10-CM

## 2025-07-10 PROCEDURE — 99203 OFFICE O/P NEW LOW 30 MIN: CPT | Performed by: PODIATRIST

## 2025-07-10 PROCEDURE — 1125F AMNT PAIN NOTED PAIN PRSNT: CPT | Performed by: PODIATRIST

## 2025-07-10 ASSESSMENT — PAIN SCALES - GENERAL: PAINLEVEL_OUTOF10: SEVERE PAIN (7)

## 2025-07-10 NOTE — LETTER
"7/10/2025      Deborah Collins  [address]  [facility] [address]      Dear Colleague,    Thank you for referring your patient, Deborah Collins, to the [facility]. Please see a copy of my visit note below.    PATIENT HISTORY:  Deborah Collins is a 69 year old female who presents to clinic with a chief complaint of a painful right foot.  The patient relates the pain is located over the fourth toe on the left foot.  The patient relates injuring the foot on 7/3/2025 .  The patient was seen by primary care with x-rays revealing a fracture of the fourth toe on the left foot.   The patient was instructed to follow up in my clinic for further evaluation and treatment options.    Medical, surgical and family history was reviewed in the chart.    Vitals: Ht 1.613 m (5' 3.5\")   Wt 65.3 kg (144 lb)   LMP 07/30/2009   BMI 25.11 kg/m    BMI= Body mass index is 25.11 kg/m .    LOWER EXTREMITY PHYSICAL EXAM    Dermatologic: Skin is intact to left lower extremities without significant lesions, rash or abrasion.        Vascular: DP & PT pulses are intact & regular on the left.   CFT and skin temperature is normal to the left lower extremities.     Neurologic: Lower extremity sensation is intact to light touch.  No evidence of weakness in the left lower extremities.        Musculoskeletal: Patient is ambulatory without assistive device or brace.  No gross ankle deformity noted.  No foot or ankle joint effusion is noted.  Noted pain on palpation of the fourth toe on the left foot.  Mild edema noted.  The toe is in normal anatomical position.    Diagnostics: Radiographs were evaluated including non-weightbearing AP, lateral and medial oblique views of the left foot reveals a minimally displaced fracture of the proximal phalanx of the fourth toe.  No cortical erosions or periosteal elevation.  All joint margins appear stable.  There is no apparent tumor formation noted.  There is no evidence of " foreign body.  The images were reviewed with the patient explaining the findings.      ASSESSMENT / PLAN:     ICD-10-CM    1. Closed displaced fracture of proximal phalanx of lesser toe of left foot, initial encounter  S92.512A Orthopedic  Referral          I have explained to Deborah about the conditions.  We discussed the underlying contributing factors of the condition as well as the treatment plan and expected length of recovery.  At this time, patient will continue offloading the left foot in a stiff soled shoe.  The patient may return in 4 to 6 weeks for reevaluation and repeat x-rays.    Deborah verbalized agreement with and understanding of the rational for the diagnosis and treatment plan.  All questions were answered to best of my ability and the patient's satisfaction. The patient was advised to contact the clinic with any questions that may arise after the clinic visit.      Disclaimer: This note consists of symbols derived from keyboarding, dictation and/or voice recognition software. As a result, there may be errors in the script that have gone undetected. Please consider this when interpreting information found in this chart.       LEOLA Smallwood.PJUAN MANUEL., F.A.C.F.A.S.      Again, thank you for allowing me to participate in the care of your patient.        Sincerely,        Damian Aponte DPM    Electronically signed

## 2025-07-10 NOTE — NURSING NOTE
"Chief Complaint   Patient presents with    Fracture     Left foot- missed a step on the ladder getting into the pool       Initial Ht 1.613 m (5' 3.5\")   Wt 65.3 kg (144 lb)   LMP 07/30/2009   BMI 25.11 kg/m   Estimated body mass index is 25.11 kg/m  as calculated from the following:    Height as of this encounter: 1.613 m (5' 3.5\").    Weight as of this encounter: 65.3 kg (144 lb).  Medications and allergies reviewed.      Kitty BHAGAT MA    "

## 2025-07-18 ENCOUNTER — OFFICE VISIT (OUTPATIENT)
Dept: FAMILY MEDICINE | Facility: CLINIC | Age: 70
End: 2025-07-18
Payer: COMMERCIAL

## 2025-07-18 ENCOUNTER — ANCILLARY PROCEDURE (OUTPATIENT)
Dept: GENERAL RADIOLOGY | Facility: CLINIC | Age: 70
End: 2025-07-18
Attending: NURSE PRACTITIONER
Payer: COMMERCIAL

## 2025-07-18 VITALS
WEIGHT: 139 LBS | HEART RATE: 82 BPM | OXYGEN SATURATION: 100 % | TEMPERATURE: 98.3 F | BODY MASS INDEX: 24.63 KG/M2 | SYSTOLIC BLOOD PRESSURE: 102 MMHG | RESPIRATION RATE: 16 BRPM | HEIGHT: 63 IN | DIASTOLIC BLOOD PRESSURE: 68 MMHG

## 2025-07-18 DIAGNOSIS — M19.041 PRIMARY OSTEOARTHRITIS OF BOTH HANDS: ICD-10-CM

## 2025-07-18 DIAGNOSIS — Z13.6 SCREENING FOR CARDIOVASCULAR CONDITION: ICD-10-CM

## 2025-07-18 DIAGNOSIS — F41.9 ANXIETY: ICD-10-CM

## 2025-07-18 DIAGNOSIS — M62.838 MUSCLE SPASM: ICD-10-CM

## 2025-07-18 DIAGNOSIS — Z12.11 SCREEN FOR COLON CANCER: ICD-10-CM

## 2025-07-18 DIAGNOSIS — F33.1 MAJOR DEPRESSIVE DISORDER, RECURRENT EPISODE, MODERATE (H): ICD-10-CM

## 2025-07-18 DIAGNOSIS — Z13.1 SCREENING FOR DIABETES MELLITUS: ICD-10-CM

## 2025-07-18 DIAGNOSIS — M19.042 PRIMARY OSTEOARTHRITIS OF BOTH HANDS: ICD-10-CM

## 2025-07-18 DIAGNOSIS — Z12.31 ENCOUNTER FOR SCREENING MAMMOGRAM FOR BREAST CANCER: ICD-10-CM

## 2025-07-18 DIAGNOSIS — I10 BENIGN ESSENTIAL HYPERTENSION: ICD-10-CM

## 2025-07-18 DIAGNOSIS — G25.81 RESTLESS LEG SYNDROME: ICD-10-CM

## 2025-07-18 DIAGNOSIS — I10 ESSENTIAL HYPERTENSION: ICD-10-CM

## 2025-07-18 DIAGNOSIS — E78.5 HYPERLIPIDEMIA LDL GOAL <100: ICD-10-CM

## 2025-07-18 DIAGNOSIS — Z00.00 ENCOUNTER FOR MEDICARE ANNUAL WELLNESS EXAM: Primary | ICD-10-CM

## 2025-07-18 LAB
ALBUMIN SERPL BCG-MCNC: 4.6 G/DL (ref 3.5–5.2)
ALP SERPL-CCNC: 55 U/L (ref 40–150)
ALT SERPL W P-5'-P-CCNC: 14 U/L (ref 0–50)
ANION GAP SERPL CALCULATED.3IONS-SCNC: 16 MMOL/L (ref 7–15)
AST SERPL W P-5'-P-CCNC: 22 U/L (ref 0–45)
BILIRUB SERPL-MCNC: 0.8 MG/DL
BUN SERPL-MCNC: 8.3 MG/DL (ref 8–23)
CALCIUM SERPL-MCNC: 9.3 MG/DL (ref 8.8–10.4)
CHLORIDE SERPL-SCNC: 102 MMOL/L (ref 98–107)
CHOLEST SERPL-MCNC: 209 MG/DL
CREAT SERPL-MCNC: 0.76 MG/DL (ref 0.51–0.95)
EGFRCR SERPLBLD CKD-EPI 2021: 85 ML/MIN/1.73M2
ERYTHROCYTE [DISTWIDTH] IN BLOOD BY AUTOMATED COUNT: 11.9 % (ref 10–15)
FASTING STATUS PATIENT QL REPORTED: NO
FASTING STATUS PATIENT QL REPORTED: NO
GLUCOSE SERPL-MCNC: 87 MG/DL (ref 70–99)
HCO3 SERPL-SCNC: 21 MMOL/L (ref 22–29)
HCT VFR BLD AUTO: 40.6 % (ref 35–47)
HDLC SERPL-MCNC: 74 MG/DL
HGB BLD-MCNC: 13.4 G/DL (ref 11.7–15.7)
LDLC SERPL CALC-MCNC: 118 MG/DL
MCH RBC QN AUTO: 30.3 PG (ref 26.5–33)
MCHC RBC AUTO-ENTMCNC: 33 G/DL (ref 31.5–36.5)
MCV RBC AUTO: 92 FL (ref 78–100)
NONHDLC SERPL-MCNC: 135 MG/DL
PLATELET # BLD AUTO: 253 10E3/UL (ref 150–450)
POTASSIUM SERPL-SCNC: 4.3 MMOL/L (ref 3.4–5.3)
PROT SERPL-MCNC: 7.3 G/DL (ref 6.4–8.3)
RBC # BLD AUTO: 4.42 10E6/UL (ref 3.8–5.2)
SODIUM SERPL-SCNC: 139 MMOL/L (ref 135–145)
TRIGL SERPL-MCNC: 83 MG/DL
TSH SERPL DL<=0.005 MIU/L-ACNC: 0.48 UIU/ML (ref 0.3–4.2)
WBC # BLD AUTO: 7 10E3/UL (ref 4–11)

## 2025-07-18 PROCEDURE — 80061 LIPID PANEL: CPT | Performed by: NURSE PRACTITIONER

## 2025-07-18 PROCEDURE — 3074F SYST BP LT 130 MM HG: CPT | Performed by: NURSE PRACTITIONER

## 2025-07-18 PROCEDURE — 99214 OFFICE O/P EST MOD 30 MIN: CPT | Mod: 25 | Performed by: NURSE PRACTITIONER

## 2025-07-18 PROCEDURE — 73120 X-RAY EXAM OF HAND: CPT | Mod: TC | Performed by: INTERNAL MEDICINE

## 2025-07-18 PROCEDURE — 1125F AMNT PAIN NOTED PAIN PRSNT: CPT | Performed by: NURSE PRACTITIONER

## 2025-07-18 PROCEDURE — 84443 ASSAY THYROID STIM HORMONE: CPT | Performed by: NURSE PRACTITIONER

## 2025-07-18 PROCEDURE — G0438 PPPS, INITIAL VISIT: HCPCS | Performed by: NURSE PRACTITIONER

## 2025-07-18 PROCEDURE — 3049F LDL-C 100-129 MG/DL: CPT | Performed by: NURSE PRACTITIONER

## 2025-07-18 PROCEDURE — 3078F DIAST BP <80 MM HG: CPT | Performed by: NURSE PRACTITIONER

## 2025-07-18 PROCEDURE — 80053 COMPREHEN METABOLIC PANEL: CPT | Performed by: NURSE PRACTITIONER

## 2025-07-18 PROCEDURE — 85027 COMPLETE CBC AUTOMATED: CPT | Performed by: NURSE PRACTITIONER

## 2025-07-18 PROCEDURE — 36415 COLL VENOUS BLD VENIPUNCTURE: CPT | Performed by: NURSE PRACTITIONER

## 2025-07-18 RX ORDER — QUETIAPINE FUMARATE 25 MG/1
25 TABLET, FILM COATED ORAL 2 TIMES DAILY
Qty: 90 TABLET | Refills: 3 | Status: SHIPPED | OUTPATIENT
Start: 2025-07-18

## 2025-07-18 RX ORDER — ATORVASTATIN CALCIUM 20 MG/1
20 TABLET, FILM COATED ORAL DAILY
Qty: 90 TABLET | Refills: 3 | Status: SHIPPED | OUTPATIENT
Start: 2025-07-18

## 2025-07-18 RX ORDER — AMLODIPINE BESYLATE 2.5 MG/1
2.5 TABLET ORAL DAILY
Qty: 90 TABLET | Refills: 3 | Status: SHIPPED | OUTPATIENT
Start: 2025-07-18

## 2025-07-18 RX ORDER — ROPINIROLE 2 MG/1
2 TABLET, FILM COATED ORAL AT BEDTIME
Qty: 60 TABLET | Refills: 3 | Status: SHIPPED | OUTPATIENT
Start: 2025-07-18

## 2025-07-18 RX ORDER — MULTIVITAMIN
1 TABLET ORAL DAILY
COMMUNITY

## 2025-07-18 RX ORDER — TIZANIDINE 2 MG/1
2 TABLET ORAL 2 TIMES DAILY PRN
Qty: 60 TABLET | Refills: 0 | Status: SHIPPED | OUTPATIENT
Start: 2025-07-18

## 2025-07-18 RX ORDER — BUPROPION HYDROCHLORIDE 150 MG/1
150 TABLET ORAL DAILY
Qty: 90 TABLET | Refills: 3 | Status: SHIPPED | OUTPATIENT
Start: 2025-07-18

## 2025-07-18 SDOH — HEALTH STABILITY: PHYSICAL HEALTH: ON AVERAGE, HOW MANY DAYS PER WEEK DO YOU ENGAGE IN MODERATE TO STRENUOUS EXERCISE (LIKE A BRISK WALK)?: 0 DAYS

## 2025-07-18 ASSESSMENT — PAIN SCALES - GENERAL: PAINLEVEL_OUTOF10: SEVERE PAIN (9)

## 2025-07-18 ASSESSMENT — PATIENT HEALTH QUESTIONNAIRE - PHQ9
10. IF YOU CHECKED OFF ANY PROBLEMS, HOW DIFFICULT HAVE THESE PROBLEMS MADE IT FOR YOU TO DO YOUR WORK, TAKE CARE OF THINGS AT HOME, OR GET ALONG WITH OTHER PEOPLE: NOT DIFFICULT AT ALL
SUM OF ALL RESPONSES TO PHQ QUESTIONS 1-9: 2
SUM OF ALL RESPONSES TO PHQ QUESTIONS 1-9: 2

## 2025-07-18 ASSESSMENT — SOCIAL DETERMINANTS OF HEALTH (SDOH): HOW OFTEN DO YOU GET TOGETHER WITH FRIENDS OR RELATIVES?: ONCE A WEEK

## 2025-07-18 NOTE — PROGRESS NOTES
Preventive Care Visit  [facility]  TRINY Quinones CNP, Family Medicine  Jul 18, 2025      Assessment & Plan   Problem List Items Addressed This Visit          Cardiovascular/Peripheral Vascular    Essential hypertension    Relevant Orders    OFFICE/OUTPT VISIT,EST,LEVL IV (Completed)    OFFICE/OUTPT VISIT,EST,LEVL IV (Completed)       Behavioral Health    Major depressive disorder, recurrent episode, moderate (H)    Relevant Medications    buPROPion (WELLBUTRIN XL) 150 MG 24 hr tablet    FLUoxetine (PROZAC) 20 MG capsule    Other Relevant Orders    OFFICE/OUTPT VISIT,EST,LEVL IV (Completed)    OFFICE/OUTPT VISIT,EST,LEVL IV (Completed)       Neurology/Sleep    Restless leg syndrome    Relevant Medications    tiZANidine (ZANAFLEX) 2 MG tablet    rOPINIRole (REQUIP) 2 MG tablet    QUEtiapine (SEROQUEL) 25 MG tablet    Other Relevant Orders    OFFICE/OUTPT VISIT,EST,LEVL IV (Completed)    OFFICE/OUTPT VISIT,EST,LEVL IV (Completed)       Orthopedic/Musculoskeletal    Primary osteoarthritis of both hands    Relevant Medications    Calcium Carbonate-Vit D-Min (CALCIUM 1200 PO)    tiZANidine (ZANAFLEX) 2 MG tablet    Other Relevant Orders    XR Hands Bilateral PA View (Completed)    Orthopedic  Referral    OFFICE/OUTPT VISIT,EST,LEVL IV (Completed)    OFFICE/OUTPT VISIT,EST,LEVL IV (Completed)     Other Visit Diagnoses         Encounter for Medicare annual wellness exam    -  Primary    Relevant Orders    REVIEW OF HEALTH MAINTENANCE PROTOCOL ORDERS (Completed)    TSH with free T4 reflex (Completed)    Comprehensive metabolic panel (BMP + Alb, Alk Phos, ALT, AST, Total. Bili, TP) (Completed)    CBC with platelets (Completed)      Anxiety        Relevant Medications    buPROPion (WELLBUTRIN XL) 150 MG 24 hr tablet    FLUoxetine (PROZAC) 20 MG capsule    Other Relevant Orders    OFFICE/OUTPT VISIT,EST,LEVL IV (Completed)    OFFICE/OUTPT VISIT,EST,LEVL IV (Completed)      Benign  essential hypertension        Relevant Medications    amLODIPine (NORVASC) 2.5 MG tablet    Other Relevant Orders    OFFICE/OUTPT VISIT,EST,LEVL IV (Completed)    OFFICE/OUTPT VISIT,EST,LEVL IV (Completed)      Muscle spasm        Relevant Medications    tiZANidine (ZANAFLEX) 2 MG tablet    Other Relevant Orders    OFFICE/OUTPT VISIT,EST,LEVL IV (Completed)    OFFICE/OUTPT VISIT,EST,LEVL IV (Completed)      Screening for diabetes mellitus          Screen for colon cancer        Relevant Orders    Colonoscopy Screening  Referral      Screening for cardiovascular condition          Encounter for screening mammogram for breast cancer        Relevant Orders    MA Screen Bilateral w/Spike      Hyperlipidemia LDL goal <100        Relevant Medications    atorvastatin (LIPITOR) 20 MG tablet    Other Relevant Orders    Lipid panel reflex to direct LDL Non-fasting (Completed)    OFFICE/OUTPT VISIT,EST,LEVL IV (Completed)    OFFICE/OUTPT VISIT,EST,LEVL IV (Completed)           Patient has been advised of split billing requirements and indicates understanding: Yes    Counseling  Appropriate preventive services were addressed with this patient via screening, questionnaire, or discussion as appropriate for fall prevention, nutrition, physical activity, Tobacco-use cessation, social engagement, weight loss and cognition.  Checklist reviewing preventive services available has been given to the patient.  Reviewed patient's diet, addressing concerns and/or questions.   Patient reported safety concerns were addressed today.Information on urinary incontinence and treatment options given to patient.   Reviewed preventive health counseling, as reflected in patient instructions       Regular exercise       Healthy diet/nutrition       Vision screening       Hearing screening       Alcohol use       Folic Acid       Osteoporosis prevention/bone health       Colorectal Cancer Screening       (Wanda)menopause management    Follow-up     Follow-up Visit   Expected date:  Jul 25, 2026 (Approximate)      Follow Up Appointment Details:     Follow-up with whom?: PCP    Follow-Up for what?: Medicare Wellness    Welcome or Annual?: Annual Wellness    How?: In Person             Follow-up Visit   Expected date:  Jul 25, 2026 (Approximate)      Follow Up Appointment Details:     Follow-up with whom?: PCP    Follow-Up for what?: Medicare Wellness    Welcome or Annual?: Annual Wellness    How?: In Person                 Manav Cabrera is a 69 year old, presenting for the following:  Physical        7/18/2025    11:01 AM   Additional Questions   Roomed by Krysta ESCOBAR       Hyperlipidemia Follow-Up    Are you regularly taking any medication or supplement to lower your cholesterol?   Yes- Atorvastatin  Are you having muscle aches or other side effects that you think could be caused by your cholesterol lowering medication?  No    Hypertension Follow-up    Do you check your blood pressure regularly outside of the clinic? No   Are you following a low salt diet? No      BP Readings from Last 2 Encounters:   07/18/25 102/68   07/03/25 124/78       Advance Care Planning    Discussed advance care planning with patient; however, patient declined at this time.        7/18/2025   General Health   How would you rate your overall physical health? (!) FAIR   Feel stress (tense, anxious, or unable to sleep) Only a little   (!) STRESS CONCERN      7/18/2025   Nutrition   Diet: Regular (no restrictions)         7/18/2025   Exercise   Days per week of moderate/strenous exercise 0 days   (!) EXERCISE CONCERN      7/18/2025   Social Factors   Frequency of gathering with friends or relatives Once a week   Worry food won't last until get money to buy more No   Food not last or not have enough money for food? No   Do you have housing? (Housing is defined as stable permanent housing and does not include staying outside in a car, in a tent, in an abandoned building, in  an overnight shelter, or couch-surfing.) Yes   Are you worried about losing your housing? No   Lack of transportation? No   Unable to get utilities (heat,electricity)? No         7/18/2025   Fall Risk   Fallen 2 or more times in the past year? Yes   Trouble with walking or balance? No   Gait Speed Test (Document in seconds) 4          7/18/2025   Activities of Daily Living- Home Safety   Needs help with the following daily activites None of the above   Safety concerns in the home No handrails on the stairs         7/18/2025   Dental   Dentist two times every year? (!) DECLINE         7/18/2025   Hearing Screening   Hearing concerns? None of the above         7/18/2025   Driving Risk Screening   Patient/family members have concerns about driving No         7/18/2025   General Alertness/Fatigue Screening   Have you been more tired than usual lately? No         7/18/2025   Urinary Incontinence Screening   Bothered by leaking urine in past 6 months Yes       Today's PHQ-9 Score:       7/18/2025    10:48 AM   PHQ-9 SCORE   PHQ-9 Total Score MyChart 2 (Minimal depression)   PHQ-9 Total Score 2        Patient-reported         7/18/2025   Substance Use   Alcohol more than 3/day or more than 7/wk No   Do you have a current opioid prescription? No   How severe/bad is pain from 1 to 10? 7/10   Do you use any other substances recreationally? No     Social History     Tobacco Use    Smoking status: Never    Smokeless tobacco: Never   Vaping Use    Vaping status: Never Used   Substance Use Topics    Alcohol use: No    Drug use: No          Mammogram Screening - Mammogram every 1-2 years updated in Health Maintenance based on mutual decision making      History of abnormal Pap smear: No - age 65 or older with pap indicated due to inadequate prior screening (3 consecutive negative cytology results, 2 consecutive negative cotesting results, or 2 consecutive negative HrHPV test results within 10 years, with the most recent test  occurring within the recommended screening interval for the test used)        2009    12:00 AM   PAP / HPV   PAP (Historical) NIL      ASCVD Risk   The ASCVD Risk score (Vidal PHELAN, et al., 2019) failed to calculate for the following reasons:    Cannot find a previous HDL lab    Cannot find a previous total cholesterol lab    Fracture Risk Assessment Tool  Link to Frax Calculator  Use the information below to complete the Frax calculator  : 1955  Sex: female  Weight (kg): 63.1 kg (actual weight)  Height (cm): 160 cm  Previous Fragility Fracture:  No  History of parent with fractured hip:  No  Current Smoking:  No  Patient has been on glucocorticoids for more than 3 months (5mg/day or more): No  Rheumatoid Arthritis on Problem List:  No  Secondary Osteoporosis on Problem List:  No  Consumes 3 or more units of alcohol per day: No  Femoral Neck BMD (g/cm2)            Reviewed and updated as needed this visit by Provider                    BP Readings from Last 3 Encounters:   25 102/68   25 124/78   24 124/78    Wt Readings from Last 3 Encounters:   25 63 kg (139 lb)   07/10/25 65.3 kg (144 lb)   25 65.3 kg (144 lb)                  Patient Active Problem List   Diagnosis    CARDIOVASCULAR SCREENING; LDL GOAL LESS THAN 160    Major depressive disorder, recurrent episode, moderate (H)    Adjustment disorder with mixed anxiety and depressed mood    Primary osteoarthritis of both hands    Choroidal nevus    Essential hypertension    Family history of cardiovascular disease    Gastroesophageal reflux disease without esophagitis    Osteopenia    Primary insomnia    Restless leg syndrome    Subclinical hyperthyroidism     Past Surgical History:   Procedure Laterality Date    ANESTH,ELBOW AREA SKIN SURG      HC EXCISION BREAST LESION, OPEN >=1      TONSILLECTOMY      TUBAL LIGATION         Social History     Tobacco Use    Smoking status: Never    Smokeless tobacco: Never    Substance Use Topics    Alcohol use: No     Family History   Problem Relation Age of Onset    Cerebrovascular Disease Mother         AGE 68    Cardiovascular Mother         HEART ATTACK    Cerebrovascular Disease Maternal Grandmother     Cardiovascular Maternal Grandmother         HEART ATTACK    Cerebrovascular Disease Maternal Grandfather     Cardiovascular Maternal Grandfather         HEART ATTACK    Breast Cancer Maternal Aunt     Diabetes Daughter         TYPE 1         Current Outpatient Medications   Medication Sig Dispense Refill    amLODIPine (NORVASC) 2.5 MG tablet Take 1 tablet (2.5 mg) by mouth daily. 90 tablet 3    aspirin 81 MG EC tablet Take 81 mg by mouth daily.      atorvastatin (LIPITOR) 20 MG tablet Take 1 tablet (20 mg) by mouth daily. 90 tablet 3    Boswellia-Glucosamine-Vit D (OSTEO BI-FLEX ONE PER DAY PO)       buPROPion (WELLBUTRIN XL) 150 MG 24 hr tablet Take 1 tablet (150 mg) by mouth daily. 90 tablet 3    Calcium Carbonate-Vit D-Min (CALCIUM 1200 PO) Take by mouth.      FLUoxetine (PROZAC) 20 MG capsule Take 2 capsules (40 mg) by mouth daily. 180 capsule 3    multivitamin w/minerals (MULTI-VITAMIN) tablet Take 1 tablet by mouth daily.      QUEtiapine (SEROQUEL) 25 MG tablet Take 1 tablet (25 mg) by mouth 2 times daily. PRN 90 tablet 3    rOPINIRole (REQUIP) 2 MG tablet Take 1 tablet (2 mg) by mouth at bedtime. 60 tablet 3    tiZANidine (ZANAFLEX) 2 MG tablet Take 1 tablet (2 mg) by mouth 2 times daily as needed for muscle spasms. 60 tablet 0     Allergies   Allergen Reactions    Hydrocodone Itching     hallucination    Morphine And Codeine Nausea and Vomiting     Recent Labs   Lab Test 07/18/25  1154   *   HDL 74   TRIG 83   ALT 14   CR 0.76   GFRESTIMATED 85   POTASSIUM 4.3   TSH 0.48      Current providers sharing in care for this patient include:  Patient Care Team:  Fany Higgins APRN CNP as PCP - General (Family Medicine)  Fany Higgins APRN CNP as Assigned  "PCP  Raeann Bonilla MD as Assigned Musculoskeletal Provider    The following health maintenance items are reviewed in Epic and correct as of today:  Health Maintenance   Topic Date Due    BMP  Never done    LIPID  Never done    ADVANCE CARE PLANNING  Never done    DEPRESSION ACTION PLAN  Never done    ZOSTER VACCINE (1 of 2) Never done    DIABETES SCREENING  10/22/2018    MEDICARE ANNUAL WELLNESS VISIT  12/28/2021    COLORECTAL CANCER SCREENING  11/03/2023    COVID-19 VACCINE (4 - 2024-25 season) 09/01/2024    ANNUAL REVIEW OF HM ORDERS  07/30/2025    INFLUENZA VACCINE (1) 09/01/2025    PHQ-9  01/18/2026    MAMMO SCREENING  05/06/2026    FALL RISK ASSESSMENT  07/18/2026    DTAP/TDAP/TD VACCINE (3 - Td or Tdap) 02/27/2027    RSV VACCINE (1 - 1-dose 75+ series) 12/28/2031    DEXA  06/02/2037    HEPATITIS C SCREENING  Completed    PNEUMOCOCCAL VACCINE 50+ YEARS  Completed    HPV VACCINE  Aged Out    MENINGITIS VACCINE  Aged Out    PAP  Discontinued         Review of Systems  Constitutional, HEENT, cardiovascular, pulmonary, gi and gu systems are negative, except as otherwise noted.     Objective    Exam  /68   Pulse 82   Temp 98.3  F (36.8  C) (Tympanic)   Resp 16   Ht 1.6 m (5' 3\")   Wt 63 kg (139 lb)   LMP 07/30/2009   SpO2 100%   BMI 24.62 kg/m     Estimated body mass index is 24.62 kg/m  as calculated from the following:    Height as of this encounter: 1.6 m (5' 3\").    Weight as of this encounter: 63 kg (139 lb).    Physical Exam  GENERAL: alert and no distress  EYES: Eyes grossly normal to inspection, PERRL and conjunctivae and sclerae normal  HENT: ear canals and TM's normal, nose and mouth without ulcers or lesions  NECK: no adenopathy, no asymmetry, masses, or scars  RESP: lungs clear to auscultation - no rales, rhonchi or wheezes  CV: regular rate and rhythm, normal S1 S2, no S3 or S4, no murmur, click or rub, no peripheral edema  ABDOMEN: soft, nontender, no hepatosplenomegaly, no masses and " bowel sounds normal  MS: no gross musculoskeletal defects noted, no edema  SKIN: no suspicious lesions or rashes  NEURO: Normal strength and tone, mentation intact and speech normal  PSYCH: mentation appears normal, affect normal/bright  Gait and balance assessed per Gait Speed Test.  Result as above.        7/18/2025   Mini Cog   Clock Draw Score 2 Normal   3 Item Recall 3 objects recalled   Mini Cog Total Score 5         Vision Screen         Signed Electronically by: TRINY Quinones CNP    Answers submitted by the patient for this visit:  Patient Health Questionnaire (Submitted on 7/18/2025)  If you checked off any problems, how difficult have these problems made it for you to do your work, take care of things at home, or get along with other people?: Not difficult at all  PHQ9 TOTAL SCORE: 2

## 2025-07-18 NOTE — PATIENT INSTRUCTIONS
Patient Education   Preventive Care Advice   This is general advice given by our system to help you stay healthy. However, your care team may have specific advice just for you. Please talk to your care team about your preventive care needs.  Nutrition  Eat 5 or more servings of fruits and vegetables each day.  Try wheat bread, brown rice and whole grain pasta (instead of white bread, rice, and pasta).  Get enough calcium and vitamin D. Check the label on foods and aim for 100% of the RDA (recommended daily allowance).  Lifestyle  Exercise at least 150 minutes each week  (30 minutes a day, 5 days a week).  Do muscle strengthening activities 2 days a week. These help control your weight and prevent disease.  No smoking.  Wear sunscreen to prevent skin cancer.  Have a dental exam and cleaning every 6 months.  Yearly exams  See your health care team every year to talk about:  Any changes in your health.  Any medicines your care team has prescribed.  Preventive care, family planning, and ways to prevent chronic diseases.  Shots (vaccines)   HPV shots (up to age 26), if you've never had them before.  Hepatitis B shots (up to age 59), if you've never had them before.  COVID-19 shot: Get this shot when it's due.  Flu shot: Get a flu shot every year.  Tetanus shot: Get a tetanus shot every 10 years.  Pneumococcal, hepatitis A, and RSV shots: Ask your care team if you need these based on your risk.  Shingles shot (for age 50 and up)  General health tests  Diabetes screening:  Starting at age 35, Get screened for diabetes at least every 3 years.  If you are younger than age 35, ask your care team if you should be screened for diabetes.  Cholesterol test: At age 39, start having a cholesterol test every 5 years, or more often if advised.  Bone density scan (DEXA): At age 50, ask your care team if you should have this scan for osteoporosis (brittle bones).  Hepatitis C: Get tested at least once in your life.  STIs (sexually  transmitted infections)  Before age 24: Ask your care team if you should be screened for STIs.  After age 24: Get screened for STIs if you're at risk. You are at risk for STIs (including HIV) if:  You are sexually active with more than one person.  You don't use condoms every time.  You or a partner was diagnosed with a sexually transmitted infection.  If you are at risk for HIV, ask about PrEP medicine to prevent HIV.  Get tested for HIV at least once in your life, whether you are at risk for HIV or not.  Cancer screening tests  Cervical cancer screening: If you have a cervix, begin getting regular cervical cancer screening tests starting at age 21.  Breast cancer scan (mammogram): If you've ever had breasts, begin having regular mammograms starting at age 40. This is a scan to check for breast cancer.  Colon cancer screening: It is important to start screening for colon cancer at age 45.  Have a colonoscopy test every 10 years (or more often if you're at risk) Or, ask your provider about stool tests like a FIT test every year or Cologuard test every 3 years.  To learn more about your testing options, visit:   .  For help making a decision, visit:   https://bit.ly/yk30813.  Prostate cancer screening test: If you have a prostate, ask your care team if a prostate cancer screening test (PSA) at age 55 is right for you.  Lung cancer screening: If you are a current or former smoker ages 50 to 80, ask your care team if ongoing lung cancer screenings are right for you.  For informational purposes only. Not to replace the advice of your health care provider. Copyright   2023 [facility]. All rights reserved. Clinically reviewed by the [facility] Transitions Program. [employer] [id number] - REV 01/24.  Learning About Activities of Daily Living  What are activities of daily living?     Activities of daily living (ADLs) are the basic self-care tasks you do every day. These include eating, bathing, dressing,  and moving around.  As you age, and if you have health problems, you may find that it's harder to do some of these tasks. If so, your doctor can suggest ideas that may help.  To measure what kind of help you may need, your doctor will ask how well you are able to do ADLs. Let your doctor know if there are any tasks that you are having trouble doing. This is an important first step to getting help. And when you have the help you need, you can stay as independent as possible.  How will a doctor assess your ADLs?  Asking about ADLs is part of a routine health checkup your doctor will likely do as you age. Your health check might be done in a doctor's office, in your home, or at a hospital. The goal is to find out if you are having any problems that could make it hard to care for yourself or that make it unsafe for you to be on your own.  To measure your ADLs, your doctor will ask how hard it is for you to do routine tasks. Your doctor may also want to know if you have changed the way you do a task because of a health problem. Your doctor may watch how you:  Walk back and forth.  Keep your balance while you stand or walk.  Move from sitting to standing or from a bed to a chair.  Button or unbutton a shirt or sweater.  Remove and put on your shoes.  It's common to feel a little worried or anxious if you find you can't do all the things you used to be able to do. Talking with your doctor about ADLs is a way to make sure you're as safe as possible and able to care for yourself as well as you can. You may want to bring a caregiver, friend, or family member to your checkup. They can help you talk to your doctor.  Follow-up care is a key part of your treatment and safety. Be sure to make and go to all appointments, and call your doctor if you are having problems. It's also a good idea to know your test results and keep a list of the medicines you take.  Current as of: October 24, 2024  Content Version: 14.5    5329-4008  [employer].   Care instructions adapted under license by your healthcare professional. If you have questions about a medical condition or this instruction, always ask your healthcare professional. [employer] disclaims any warranty or liability for your use of this information.    Preventing Falls: Care Instructions  Injuries and health problems such as trouble walking or poor eyesight can increase your risk of falling. So can some medicines. But there are things you can do to help prevent falls. You can exercise to get stronger. You can also arrange your home to make it safer.    Talk to your doctor about the medicines you take. Ask if any of them increase the risk of falls and whether they can be changed or stopped.   Try to exercise regularly. It can help improve your strength and balance. This can help lower your risk of falling.         Practice fall safety and prevention.   Wear low-heeled shoes that fit well and give your feet good support. Talk to your doctor if you have foot problems that make this hard.  Carry a cellphone or wear a medical alert device that you can use to call for help.  Use stepladders instead of chairs to reach high objects. Don't climb if you're at risk for falls. Ask for help, if needed.  Wear the correct eyeglasses, if you need them.        Make your home safer.   Remove rugs, cords, clutter, and furniture from walkways.  Keep your house well lit. Use night-lights in hallways and bathrooms.  Install and use sturdy handrails on stairways.  Wear nonskid footwear, even inside. Don't walk barefoot or in socks without shoes.        Be safe outside.   Use handrails, curb cuts, and ramps whenever possible.  Keep your hands free by using a shoulder bag or backpack.  Try to walk in well-lit areas. Watch out for uneven ground, changes in pavement, and debris.  Be careful in the winter. Walk on the grass or gravel when sidewalks are slippery. Use de-icer on steps and  "walkways. Add non-slip devices to shoes.    Put grab bars and nonskid mats in your shower or tub and near the toilet. Try to use a shower chair or bath bench when bathing.   Get into a tub or shower by putting in your weaker leg first. Get out with your strong side first. Have a phone or medical alert device in the bathroom with you.   Where can you learn more?  Go to https://www.[employer].net/patiented  Enter G117 in the search box to learn more about \"Preventing Falls: Care Instructions.\"  Current as of: July 31, 2024  Content Version: 14.5    [id number] [employer].   Care instructions adapted under license by your healthcare professional. If you have questions about a medical condition or this instruction, always ask your healthcare professional. [employer] disclaims any warranty or liability for your use of this information.    Bladder Training: Care Instructions  Your Care Instructions     Bladder training is used to treat urge incontinence and stress incontinence. Urge incontinence means that the need to urinate comes on so fast that you can't get to a toilet in time. Stress incontinence means that you leak urine because of pressure on your bladder. For example, it may happen when you laugh, cough, or lift something heavy.  Bladder training can increase how long you can wait before you have to urinate. It can also help your bladder hold more urine. And it can give you better control over the urge to urinate.  It is important to remember that bladder training takes a few weeks to a few months to make a difference. You may not see results right away, but don't give up.  Follow-up care is a key part of your treatment and safety. Be sure to make and go to all appointments, and call your doctor if you are having problems. It's also a good idea to know your test results and keep a list of the medicines you take.  How can you care for yourself at home?  Work with your doctor to come up with a " "bladder training program that is right for you. You may use one or more of the following methods.  Delayed urination  In the beginning, try to keep from urinating for 5 minutes after you first feel the need to go.  While you wait, take deep, slow breaths to relax. Kegel exercises can also help you delay the need to go to the bathroom.  After some practice, when you can easily wait 5 minutes to urinate, try to wait 10 minutes before you urinate.  Slowly increase the waiting period until you are able to control when you have to urinate.  Scheduled urination  Empty your bladder when you first wake up in the morning.  Schedule times throughout the day when you will urinate.  Start by going to the bathroom every hour, even if you don't need to go.  Slowly increase the time between trips to the bathroom.  When you have found a schedule that works well for you, keep doing it.  If you wake up during the night and have to urinate, do it. Apply your schedule to waking hours only.  Kegel exercises  These tighten and strengthen pelvic muscles, which can help you control the flow of urine. (If doing these exercises causes pain, stop doing them and talk with your doctor.) To do Kegel exercises:  Squeeze your muscles as if you were trying not to pass gas. Or squeeze your muscles as if you were stopping the flow of urine. Your belly, legs, and buttocks shouldn't move.  Hold the squeeze for 3 seconds, then relax for 5 to 10 seconds.  Start with 3 seconds, then add 1 second each week until you are able to squeeze for 10 seconds.  Repeat the exercise 10 times a session. Do 3 to 8 sessions a day.  When should you call for help?  Watch closely for changes in your health, and be sure to contact your doctor if:    Your incontinence is getting worse.     You do not get better as expected.   Where can you learn more?  Go to https://www.healthwise.net/patiented  Enter V684 in the search box to learn more about \"Bladder Training: Care " "Instructions.\"  Current as of: April 30, 2024  Content Version: 14.5    5773-4808 [employer].   Care instructions adapted under license by your healthcare professional. If you have questions about a medical condition or this instruction, always ask your healthcare professional. [employer] disclaims any warranty or liability for your use of this information.       Patient Education   Preventive Care Advice   This is general advice given by our system to help you stay healthy. However, your care team may have specific advice just for you. Please talk to your care team about your preventive care needs.  Nutrition  Eat 5 or more servings of fruits and vegetables each day.  Try wheat bread, brown rice and whole grain pasta (instead of white bread, rice, and pasta).  Get enough calcium and vitamin D. Check the label on foods and aim for 100% of the RDA (recommended daily allowance).  Lifestyle  Exercise at least 150 minutes each week  (30 minutes a day, 5 days a week).  Do muscle strengthening activities 2 days a week. These help control your weight and prevent disease.  No smoking.  Wear sunscreen to prevent skin cancer.  Have a dental exam and cleaning every 6 months.  Yearly exams  See your health care team every year to talk about:  Any changes in your health.  Any medicines your care team has prescribed.  Preventive care, family planning, and ways to prevent chronic diseases.  Shots (vaccines)   HPV shots (up to age 26), if you've never had them before.  Hepatitis B shots (up to age 59), if you've never had them before.  COVID-19 shot: Get this shot when it's due.  Flu shot: Get a flu shot every year.  Tetanus shot: Get a tetanus shot every 10 years.  Pneumococcal, hepatitis A, and RSV shots: Ask your care team if you need these based on your risk.  Shingles shot (for age 50 and up)  General health tests  Diabetes screening:  Starting at age 35, Get screened for diabetes at least every 3 " years.  If you are younger than age 35, ask your care team if you should be screened for diabetes.  Cholesterol test: At age 39, start having a cholesterol test every 5 years, or more often if advised.  Bone density scan (DEXA): At age 50, ask your care team if you should have this scan for osteoporosis (brittle bones).  Hepatitis C: Get tested at least once in your life.  STIs (sexually transmitted infections)  Before age 24: Ask your care team if you should be screened for STIs.  After age 24: Get screened for STIs if you're at risk. You are at risk for STIs (including HIV) if:  You are sexually active with more than one person.  You don't use condoms every time.  You or a partner was diagnosed with a sexually transmitted infection.  If you are at risk for HIV, ask about PrEP medicine to prevent HIV.  Get tested for HIV at least once in your life, whether you are at risk for HIV or not.  Cancer screening tests  Cervical cancer screening: If you have a cervix, begin getting regular cervical cancer screening tests starting at age 21.  Breast cancer scan (mammogram): If you've ever had breasts, begin having regular mammograms starting at age 40. This is a scan to check for breast cancer.  Colon cancer screening: It is important to start screening for colon cancer at age 45.  Have a colonoscopy test every 10 years (or more often if you're at risk) Or, ask your provider about stool tests like a FIT test every year or Cologuard test every 3 years.  To learn more about your testing options, visit:   .  For help making a decision, visit:   https://bit.ly/vc29178.  Prostate cancer screening test: If you have a prostate, ask your care team if a prostate cancer screening test (PSA) at age 55 is right for you.  Lung cancer screening: If you are a current or former smoker ages 50 to 80, ask your care team if ongoing lung cancer screenings are right for you.  For informational purposes only. Not to replace the advice of your  health care provider. Copyright   2023 [facility]. All rights reserved. Clinically reviewed by the [facility] Transitions Program. [employer] [id number] - REV 01/24.  Learning About Activities of Daily Living  What are activities of daily living?     Activities of daily living (ADLs) are the basic self-care tasks you do every day. These include eating, bathing, dressing, and moving around.  As you age, and if you have health problems, you may find that it's harder to do some of these tasks. If so, your doctor can suggest ideas that may help.  To measure what kind of help you may need, your doctor will ask how well you are able to do ADLs. Let your doctor know if there are any tasks that you are having trouble doing. This is an important first step to getting help. And when you have the help you need, you can stay as independent as possible.  How will a doctor assess your ADLs?  Asking about ADLs is part of a routine health checkup your doctor will likely do as you age. Your health check might be done in a doctor's office, in your home, or at a hospital. The goal is to find out if you are having any problems that could make it hard to care for yourself or that make it unsafe for you to be on your own.  To measure your ADLs, your doctor will ask how hard it is for you to do routine tasks. Your doctor may also want to know if you have changed the way you do a task because of a health problem. Your doctor may watch how you:  Walk back and forth.  Keep your balance while you stand or walk.  Move from sitting to standing or from a bed to a chair.  Button or unbutton a shirt or sweater.  Remove and put on your shoes.  It's common to feel a little worried or anxious if you find you can't do all the things you used to be able to do. Talking with your doctor about ADLs is a way to make sure you're as safe as possible and able to care for yourself as well as you can. You may want to bring a caregiver, friend,  or family member to your checkup. They can help you talk to your doctor.  Follow-up care is a key part of your treatment and safety. Be sure to make and go to all appointments, and call your doctor if you are having problems. It's also a good idea to know your test results and keep a list of the medicines you take.  Current as of: October 24, 2024  Content Version: 14.5    3639-7127 [employer].   Care instructions adapted under license by your healthcare professional. If you have questions about a medical condition or this instruction, always ask your healthcare professional. [employer] disclaims any warranty or liability for your use of this information.    Preventing Falls: Care Instructions  Injuries and health problems such as trouble walking or poor eyesight can increase your risk of falling. So can some medicines. But there are things you can do to help prevent falls. You can exercise to get stronger. You can also arrange your home to make it safer.    Talk to your doctor about the medicines you take. Ask if any of them increase the risk of falls and whether they can be changed or stopped.   Try to exercise regularly. It can help improve your strength and balance. This can help lower your risk of falling.         Practice fall safety and prevention.   Wear low-heeled shoes that fit well and give your feet good support. Talk to your doctor if you have foot problems that make this hard.  Carry a cellphone or wear a medical alert device that you can use to call for help.  Use stepladders instead of chairs to reach high objects. Don't climb if you're at risk for falls. Ask for help, if needed.  Wear the correct eyeglasses, if you need them.        Make your home safer.   Remove rugs, cords, clutter, and furniture from walkways.  Keep your house well lit. Use night-lights in hallways and bathrooms.  Install and use sturdy handrails on stairways.  Wear nonskid footwear, even inside. Don't  "walk barefoot or in socks without shoes.        Be safe outside.   Use handrails, curb cuts, and ramps whenever possible.  Keep your hands free by using a shoulder bag or backpack.  Try to walk in well-lit areas. Watch out for uneven ground, changes in pavement, and debris.  Be careful in the winter. Walk on the grass or gravel when sidewalks are slippery. Use de-icer on steps and walkways. Add non-slip devices to shoes.    Put grab bars and nonskid mats in your shower or tub and near the toilet. Try to use a shower chair or bath bench when bathing.   Get into a tub or shower by putting in your weaker leg first. Get out with your strong side first. Have a phone or medical alert device in the bathroom with you.   Where can you learn more?  Go to https://www.[employer]/patiented  Enter G117 in the search box to learn more about \"Preventing Falls: Care Instructions.\"  Current as of: July 31, 2024  Content Version: 14.5    0654-3479 [employer].   Care instructions adapted under license by your healthcare professional. If you have questions about a medical condition or this instruction, always ask your healthcare professional. [employer] disclaims any warranty or liability for your use of this information.    Bladder Training: Care Instructions  Your Care Instructions     Bladder training is used to treat urge incontinence and stress incontinence. Urge incontinence means that the need to urinate comes on so fast that you can't get to a toilet in time. Stress incontinence means that you leak urine because of pressure on your bladder. For example, it may happen when you laugh, cough, or lift something heavy.  Bladder training can increase how long you can wait before you have to urinate. It can also help your bladder hold more urine. And it can give you better control over the urge to urinate.  It is important to remember that bladder training takes a few weeks to a few months to make a " difference. You may not see results right away, but don't give up.  Follow-up care is a key part of your treatment and safety. Be sure to make and go to all appointments, and call your doctor if you are having problems. It's also a good idea to know your test results and keep a list of the medicines you take.  How can you care for yourself at home?  Work with your doctor to come up with a bladder training program that is right for you. You may use one or more of the following methods.  Delayed urination  In the beginning, try to keep from urinating for 5 minutes after you first feel the need to go.  While you wait, take deep, slow breaths to relax. Kegel exercises can also help you delay the need to go to the bathroom.  After some practice, when you can easily wait 5 minutes to urinate, try to wait 10 minutes before you urinate.  Slowly increase the waiting period until you are able to control when you have to urinate.  Scheduled urination  Empty your bladder when you first wake up in the morning.  Schedule times throughout the day when you will urinate.  Start by going to the bathroom every hour, even if you don't need to go.  Slowly increase the time between trips to the bathroom.  When you have found a schedule that works well for you, keep doing it.  If you wake up during the night and have to urinate, do it. Apply your schedule to waking hours only.  Kegel exercises  These tighten and strengthen pelvic muscles, which can help you control the flow of urine. (If doing these exercises causes pain, stop doing them and talk with your doctor.) To do Kegel exercises:  Squeeze your muscles as if you were trying not to pass gas. Or squeeze your muscles as if you were stopping the flow of urine. Your belly, legs, and buttocks shouldn't move.  Hold the squeeze for 3 seconds, then relax for 5 to 10 seconds.  Start with 3 seconds, then add 1 second each week until you are able to squeeze for 10 seconds.  Repeat the exercise  "10 times a session. Do 3 to 8 sessions a day.  When should you call for help?  Watch closely for changes in your health, and be sure to contact your doctor if:    Your incontinence is getting worse.     You do not get better as expected.   Where can you learn more?  Go to https://www.[employer].net/patiented  Enter V684 in the search box to learn more about \"Bladder Training: Care Instructions.\"  Current as of: April 30, 2024  Content Version: 14.5    3045-3140 [employer].   Care instructions adapted under license by your healthcare professional. If you have questions about a medical condition or this instruction, always ask your healthcare professional. [employer] disclaims any warranty or liability for your use of this information.       "

## 2025-07-21 ENCOUNTER — TELEPHONE (OUTPATIENT)
Dept: GASTROENTEROLOGY | Facility: CLINIC | Age: 70
End: 2025-07-21
Payer: COMMERCIAL

## 2025-07-21 ENCOUNTER — PATIENT OUTREACH (OUTPATIENT)
Dept: CARE COORDINATION | Facility: CLINIC | Age: 70
End: 2025-07-21
Payer: COMMERCIAL

## 2025-07-21 NOTE — TELEPHONE ENCOUNTER
Endoscopy Scheduling Screen      What insurance is in the chart?  Other:  [id number]    Ordering/Referring Provider: Fany Higgins APRN CNP    (If ordering provider performs procedure, schedule with ordering provider unless otherwise instructed. )    BMI: 24.62    Sedation Ordered  general anesthesia.   BMI<= 45 45 < BMI <= 48 48 < BMI < = 50  BMI > 50   No Restrictions No [facility]  No [facility]  [facility] with exceptions Hospital Only OR Only       Do you have a history of malignant hyperthermia?  NO    (Females) Are you currently pregnant?   NO     Are you currently on dialysis?   NO    Do you need assistance transferring?   NO    BMI: There is no height or weight on file to calculate BMI.     Is patients BMI > 50?  NO    BMI > 40?  NO    Do you have a diagnosis of diabetes?  NO    Do you take an Oral or Injectable medication for weight loss or diabetes (excluding insulin)?  NO    Do you take the medication Naltrexone?  NO    Do you take blood thinners?  NO    Prep   Are you currently have chronic kidney disease?  NO    Do you have a diagnosis of cystic fibrosis (CF)?  NO    On a regular basis do you go 3 -5 days between each bowel movements?  NO    Preferred Pharmacy:    [facility] [place] [facility] [address]  [address] [address]  Phone: [phone] Fax: [phone]    [facility] [address] [address]  [address] [address] [address]  Phone: [phone] Fax: [phone]    [facility] [address] [facility] [address] [facility]  [address] [facility] [address]  Phone: [phone] Fax: [phone]    [facility] [address] [facility] [address] [facility]  [address] [facility] [place] [address]  Phone: [phone] Fax: [phone]      Final Scheduling Details     Procedure scheduled  Colonoscopy    Surgeon:  Yaneli      Date of procedure:  8/12/25     Location  [place] - Per order.    What is your  communication preference for Instructions and/or Bowel Prep?   [email or web address]    Patient Reminders:    You will receive a call from a Nurse to review instructions and health history.  This assessment must be completed prior to your procedure.  Failure to complete the Nurse assessment may result in the procedure being cancelled.       On the day of your procedure, please designate an adult(s) who can drive you home stay with you for the next 24 hours. The medicines used in the exam will make you sleepy. You will not be able to drive.       You cannot take public transportation, ride share services, or non-medical taxi service without a responsible caregiver.  Medical transport services are allowed with the requirement that a responsible caregiver will receive you at your destination.  We require that drivers and caregivers are confirmed prior to your procedure.

## 2025-07-22 ENCOUNTER — HOSPITAL ENCOUNTER (OUTPATIENT)
Dept: MAMMOGRAPHY | Facility: CLINIC | Age: 70
Discharge: HOME | End: 2025-07-22
Attending: NURSE PRACTITIONER
Payer: COMMERCIAL

## 2025-07-22 DIAGNOSIS — Z12.31 ENCOUNTER FOR SCREENING MAMMOGRAM FOR BREAST CANCER: ICD-10-CM

## 2025-07-22 PROCEDURE — 77067 SCR MAMMO BI INCL CAD: CPT

## 2025-07-23 ENCOUNTER — PATIENT OUTREACH (OUTPATIENT)
Dept: CARE COORDINATION | Facility: CLINIC | Age: 70
End: 2025-07-23
Payer: COMMERCIAL

## 2025-08-11 ENCOUNTER — ANESTHESIA EVENT (OUTPATIENT)
Dept: GASTROENTEROLOGY | Facility: CLINIC | Age: 70
End: 2025-08-11
Payer: COMMERCIAL

## 2025-08-12 ENCOUNTER — ANESTHESIA (OUTPATIENT)
Dept: GASTROENTEROLOGY | Facility: CLINIC | Age: 70
End: 2025-08-12
Payer: COMMERCIAL

## 2025-08-12 ENCOUNTER — HOSPITAL ENCOUNTER (OUTPATIENT)
Facility: CLINIC | Age: 70
Discharge: HOME | End: 2025-08-12
Attending: SURGERY | Admitting: SURGERY
Payer: COMMERCIAL

## 2025-08-12 VITALS
OXYGEN SATURATION: 99 % | BODY MASS INDEX: 24.63 KG/M2 | SYSTOLIC BLOOD PRESSURE: 122 MMHG | RESPIRATION RATE: 16 BRPM | DIASTOLIC BLOOD PRESSURE: 107 MMHG | TEMPERATURE: 97.7 F | HEART RATE: 84 BPM | HEIGHT: 63 IN | WEIGHT: 139 LBS

## 2025-08-12 LAB — COLONOSCOPY: NORMAL

## 2025-08-12 PROCEDURE — 250N000009 HC RX 250: Performed by: NURSE ANESTHETIST, CERTIFIED REGISTERED

## 2025-08-12 PROCEDURE — 88305 TISSUE EXAM BY PATHOLOGIST: CPT | Mod: TC | Performed by: SURGERY

## 2025-08-12 PROCEDURE — 45380 COLONOSCOPY AND BIOPSY: CPT | Performed by: SURGERY

## 2025-08-12 PROCEDURE — 258N000003 HC RX IP 258 OP 636: Performed by: NURSE ANESTHETIST, CERTIFIED REGISTERED

## 2025-08-12 PROCEDURE — 250N000009 HC RX 250: Performed by: PHYSICIAN ASSISTANT

## 2025-08-12 PROCEDURE — 250N000011 HC RX IP 250 OP 636: Performed by: NURSE ANESTHETIST, CERTIFIED REGISTERED

## 2025-08-12 PROCEDURE — 370N000017 HC ANESTHESIA TECHNICAL FEE, PER MIN: Performed by: SURGERY

## 2025-08-12 PROCEDURE — 258N000003 HC RX IP 258 OP 636: Performed by: PHYSICIAN ASSISTANT

## 2025-08-12 RX ORDER — ONDANSETRON 2 MG/ML
4 INJECTION INTRAMUSCULAR; INTRAVENOUS EVERY 30 MIN PRN
Status: DISCONTINUED | OUTPATIENT
Start: 2025-08-12 | End: 2025-08-12 | Stop reason: HOSPADM

## 2025-08-12 RX ORDER — OXYCODONE HYDROCHLORIDE 5 MG/1
5 TABLET ORAL
Status: DISCONTINUED | OUTPATIENT
Start: 2025-08-12 | End: 2025-08-12 | Stop reason: HOSPADM

## 2025-08-12 RX ORDER — DEXAMETHASONE SODIUM PHOSPHATE 4 MG/ML
4 INJECTION, SOLUTION INTRA-ARTICULAR; INTRALESIONAL; INTRAMUSCULAR; INTRAVENOUS; SOFT TISSUE
Status: DISCONTINUED | OUTPATIENT
Start: 2025-08-12 | End: 2025-08-12 | Stop reason: HOSPADM

## 2025-08-12 RX ORDER — LIDOCAINE HYDROCHLORIDE 20 MG/ML
INJECTION, SOLUTION INFILTRATION; PERINEURAL PRN
Status: DISCONTINUED | OUTPATIENT
Start: 2025-08-12 | End: 2025-08-12

## 2025-08-12 RX ORDER — LIDOCAINE 40 MG/G
CREAM TOPICAL
Status: DISCONTINUED | OUTPATIENT
Start: 2025-08-12 | End: 2025-08-12 | Stop reason: HOSPADM

## 2025-08-12 RX ORDER — SODIUM CHLORIDE, SODIUM LACTATE, POTASSIUM CHLORIDE, CALCIUM CHLORIDE 600; 310; 30; 20 MG/100ML; MG/100ML; MG/100ML; MG/100ML
INJECTION, SOLUTION INTRAVENOUS CONTINUOUS
Status: DISCONTINUED | OUTPATIENT
Start: 2025-08-12 | End: 2025-08-12 | Stop reason: HOSPADM

## 2025-08-12 RX ORDER — PROPOFOL 10 MG/ML
INJECTION, EMULSION INTRAVENOUS CONTINUOUS PRN
Status: DISCONTINUED | OUTPATIENT
Start: 2025-08-12 | End: 2025-08-12

## 2025-08-12 RX ORDER — PROPOFOL 10 MG/ML
INJECTION, EMULSION INTRAVENOUS PRN
Status: DISCONTINUED | OUTPATIENT
Start: 2025-08-12 | End: 2025-08-12

## 2025-08-12 RX ORDER — OXYCODONE HYDROCHLORIDE 5 MG/1
10 TABLET ORAL
Status: DISCONTINUED | OUTPATIENT
Start: 2025-08-12 | End: 2025-08-12 | Stop reason: HOSPADM

## 2025-08-12 RX ORDER — NALOXONE HYDROCHLORIDE 0.4 MG/ML
0.1 INJECTION, SOLUTION INTRAMUSCULAR; INTRAVENOUS; SUBCUTANEOUS
Status: DISCONTINUED | OUTPATIENT
Start: 2025-08-12 | End: 2025-08-12 | Stop reason: HOSPADM

## 2025-08-12 RX ORDER — ONDANSETRON 4 MG/1
4 TABLET, ORALLY DISINTEGRATING ORAL EVERY 30 MIN PRN
Status: DISCONTINUED | OUTPATIENT
Start: 2025-08-12 | End: 2025-08-12 | Stop reason: HOSPADM

## 2025-08-12 RX ADMIN — PHENYLEPHRINE HYDROCHLORIDE 100 MCG: 10 INJECTION INTRAVENOUS at 09:05

## 2025-08-12 RX ADMIN — SODIUM CHLORIDE, POTASSIUM CHLORIDE, SODIUM LACTATE AND CALCIUM CHLORIDE: 600; 310; 30; 20 INJECTION, SOLUTION INTRAVENOUS at 08:36

## 2025-08-12 RX ADMIN — LIDOCAINE HYDROCHLORIDE 60 MG: 20 INJECTION, SOLUTION INFILTRATION; PERINEURAL at 08:58

## 2025-08-12 RX ADMIN — LIDOCAINE HYDROCHLORIDE 0.1 ML: 10 INJECTION, SOLUTION EPIDURAL; INFILTRATION; INTRACAUDAL; PERINEURAL at 08:36

## 2025-08-12 RX ADMIN — PROPOFOL 200 MCG/KG/MIN: 10 INJECTION, EMULSION INTRAVENOUS at 08:58

## 2025-08-12 RX ADMIN — PROPOFOL 50 MG: 10 INJECTION, EMULSION INTRAVENOUS at 08:58

## 2025-08-12 ASSESSMENT — ACTIVITIES OF DAILY LIVING (ADL): ADLS_ACUITY_SCORE: 41

## 2025-08-13 ENCOUNTER — ANCILLARY PROCEDURE (OUTPATIENT)
Dept: GENERAL RADIOLOGY | Facility: CLINIC | Age: 70
End: 2025-08-13
Attending: STUDENT IN AN ORGANIZED HEALTH CARE EDUCATION/TRAINING PROGRAM
Payer: COMMERCIAL

## 2025-08-13 ENCOUNTER — OFFICE VISIT (OUTPATIENT)
Dept: ORTHOPEDICS | Facility: CLINIC | Age: 70
End: 2025-08-13
Payer: COMMERCIAL

## 2025-08-13 DIAGNOSIS — M19.041 PRIMARY OSTEOARTHRITIS OF BOTH HANDS: ICD-10-CM

## 2025-08-13 DIAGNOSIS — M19.042 PRIMARY OSTEOARTHRITIS OF BOTH HANDS: ICD-10-CM

## 2025-08-13 LAB
PATH REPORT.COMMENTS IMP SPEC: NORMAL
PATH REPORT.COMMENTS IMP SPEC: NORMAL
PATH REPORT.FINAL DX SPEC: NORMAL
PATH REPORT.GROSS SPEC: NORMAL
PATH REPORT.MICROSCOPIC SPEC OTHER STN: NORMAL
PATH REPORT.RELEVANT HX SPEC: NORMAL
PHOTO IMAGE: NORMAL

## 2025-08-13 PROCEDURE — 73140 X-RAY EXAM OF FINGER(S): CPT | Mod: TC | Performed by: RADIOLOGY

## 2025-08-13 PROCEDURE — 20604 DRAIN/INJ JOINT/BURSA W/US: CPT | Mod: F4 | Performed by: STUDENT IN AN ORGANIZED HEALTH CARE EDUCATION/TRAINING PROGRAM

## 2025-08-13 PROCEDURE — 88305 TISSUE EXAM BY PATHOLOGIST: CPT | Mod: 26 | Performed by: PATHOLOGY

## 2025-08-13 PROCEDURE — 99213 OFFICE O/P EST LOW 20 MIN: CPT | Mod: 25 | Performed by: STUDENT IN AN ORGANIZED HEALTH CARE EDUCATION/TRAINING PROGRAM

## 2025-08-13 RX ORDER — BETAMETHASONE SODIUM PHOSPHATE AND BETAMETHASONE ACETATE 3; 3 MG/ML; MG/ML
3 INJECTION, SUSPENSION INTRA-ARTICULAR; INTRALESIONAL; INTRAMUSCULAR; SOFT TISSUE
Status: COMPLETED | OUTPATIENT
Start: 2025-08-13 | End: 2025-08-13

## 2025-08-13 RX ORDER — ROPIVACAINE HYDROCHLORIDE 5 MG/ML
0.5 INJECTION, SOLUTION EPIDURAL; INFILTRATION; PERINEURAL
Status: COMPLETED | OUTPATIENT
Start: 2025-08-13 | End: 2025-08-13

## 2025-08-13 RX ADMIN — BETAMETHASONE SODIUM PHOSPHATE AND BETAMETHASONE ACETATE 3 MG: 3; 3 INJECTION, SUSPENSION INTRA-ARTICULAR; INTRALESIONAL; INTRAMUSCULAR; SOFT TISSUE at 14:09

## 2025-08-13 RX ADMIN — ROPIVACAINE HYDROCHLORIDE 0.5 ML: 5 INJECTION, SOLUTION EPIDURAL; INFILTRATION; PERINEURAL at 14:09

## 2025-08-14 ENCOUNTER — OFFICE VISIT (OUTPATIENT)
Dept: PHYSICAL MEDICINE AND REHAB | Facility: CLINIC | Age: 70
End: 2025-08-14
Payer: COMMERCIAL

## 2025-08-14 VITALS
SYSTOLIC BLOOD PRESSURE: 137 MMHG | DIASTOLIC BLOOD PRESSURE: 75 MMHG | HEIGHT: 63 IN | WEIGHT: 139 LBS | BODY MASS INDEX: 24.63 KG/M2 | HEART RATE: 91 BPM

## 2025-08-14 DIAGNOSIS — G89.29 CHRONIC BILATERAL LOW BACK PAIN WITHOUT SCIATICA: Primary | ICD-10-CM

## 2025-08-14 DIAGNOSIS — M47.816 LUMBAR FACET ARTHROPATHY: ICD-10-CM

## 2025-08-14 DIAGNOSIS — M51.360 DEGENERATION OF INTERVERTEBRAL DISC OF LUMBAR REGION WITH DISCOGENIC BACK PAIN: ICD-10-CM

## 2025-08-14 DIAGNOSIS — M54.50 CHRONIC BILATERAL LOW BACK PAIN WITHOUT SCIATICA: Primary | ICD-10-CM

## 2025-08-14 DIAGNOSIS — M54.50 ACUTE BILATERAL LOW BACK PAIN WITHOUT SCIATICA: ICD-10-CM

## 2025-08-14 DIAGNOSIS — Z87.81 HISTORY OF VERTEBRAL COMPRESSION FRACTURE: ICD-10-CM

## 2025-08-19 ENCOUNTER — RESULTS FOLLOW-UP (OUTPATIENT)
Dept: SURGERY | Facility: CLINIC | Age: 70
End: 2025-08-19
Payer: COMMERCIAL

## 2025-08-20 ENCOUNTER — OFFICE VISIT (OUTPATIENT)
Dept: PODIATRY | Facility: CLINIC | Age: 70
End: 2025-08-20
Payer: COMMERCIAL

## 2025-08-20 ENCOUNTER — ANCILLARY PROCEDURE (OUTPATIENT)
Dept: GENERAL RADIOLOGY | Facility: CLINIC | Age: 70
End: 2025-08-20
Attending: PODIATRIST
Payer: COMMERCIAL

## 2025-08-20 DIAGNOSIS — S92.512A CLOSED DISPLACED FRACTURE OF PROXIMAL PHALANX OF LESSER TOE OF LEFT FOOT, INITIAL ENCOUNTER: Primary | ICD-10-CM

## 2025-08-20 PROCEDURE — 99213 OFFICE O/P EST LOW 20 MIN: CPT | Performed by: PODIATRIST

## 2025-08-20 PROCEDURE — 73630 X-RAY EXAM OF FOOT: CPT | Mod: TC | Performed by: INTERNAL MEDICINE

## 2025-08-26 PROBLEM — D12.6 TUBULAR ADENOMA OF COLON: Status: ACTIVE | Noted: 2025-08-26

## 2025-08-27 ENCOUNTER — PATIENT OUTREACH (OUTPATIENT)
Dept: GASTROENTEROLOGY | Facility: CLINIC | Age: 70
End: 2025-08-27
Payer: COMMERCIAL

## (undated) DEVICE — ENDO FORCEP ENDOJAW BIOPSY 2.8MMX230CM FB-220U

## (undated) RX ORDER — LIDOCAINE HYDROCHLORIDE 10 MG/ML
INJECTION, SOLUTION EPIDURAL; INFILTRATION; INTRACAUDAL; PERINEURAL
Status: DISPENSED
Start: 2025-08-12